# Patient Record
Sex: FEMALE | Race: WHITE
[De-identification: names, ages, dates, MRNs, and addresses within clinical notes are randomized per-mention and may not be internally consistent; named-entity substitution may affect disease eponyms.]

---

## 2019-07-30 ENCOUNTER — HOSPITAL ENCOUNTER (OUTPATIENT)
Dept: HOSPITAL 95 - ORSCMMR | Age: 69
Discharge: HOME | End: 2019-07-30
Attending: ORTHOPAEDIC SURGERY
Payer: COMMERCIAL

## 2019-07-30 VITALS — WEIGHT: 154.76 LBS | HEIGHT: 64.02 IN | BODY MASS INDEX: 26.42 KG/M2

## 2019-07-30 DIAGNOSIS — F17.210: ICD-10-CM

## 2019-07-30 DIAGNOSIS — Z79.899: ICD-10-CM

## 2019-07-30 DIAGNOSIS — K21.9: ICD-10-CM

## 2019-07-30 DIAGNOSIS — S52.552A: Primary | ICD-10-CM

## 2019-07-30 DIAGNOSIS — F43.10: ICD-10-CM

## 2019-07-30 PROCEDURE — 0PSJ34Z REPOSITION LEFT RADIUS WITH INTERNAL FIXATION DEVICE, PERCUTANEOUS APPROACH: ICD-10-PCS | Performed by: ORTHOPAEDIC SURGERY

## 2019-07-30 NOTE — NUR
INTO STEP VIA BRODY. PT A&OX3. REPORTS 3/10 LEFT ARM PAIN. CAST INTACT TO
LEFT ARM-NO NOTED BLEEDING OR SWELLING. CIRC CHECK GOOD. SATS 88% ON RA.
PLACED ON 2 LITERS NASAL CANULA TO KEEP SATS>90% PT DENIES NAUSEA. GIVEN APPLE
JUICE AND GRAM CRACKERS PER PT REQUEST.

## 2019-07-30 NOTE — NUR
Patient up to Ambulate independently. Gait steady.  Dressing to procedure site
clean, dry, intact with no visible drainage, swelling, erythema or bruising
noted.  Discharge instructions reviewed with patient. Patient verbalizes
understanding.  Copy given to patient to take home.  Discharged via wheelchair
to private car for ride home.

## 2019-07-30 NOTE — NUR
PATIENT REFUSES TO REMOVE DENTURES. PATIENT STATES THAT BOTTOME IS PARTIAL AND
UPPER IS A DENTURE BUT FITS VERY TIGHT. INFORMED PATIENT THAT THERE'S A RISK
OF INJURY TO UPPER DENTURE IF LEFT IN; PATIENT AGREES.

## 2019-11-08 ENCOUNTER — HOSPITAL ENCOUNTER (OUTPATIENT)
Dept: HOSPITAL 95 - ORSCSDS | Age: 69
Discharge: HOME | End: 2019-11-08
Attending: ORTHOPAEDIC SURGERY
Payer: COMMERCIAL

## 2019-11-08 VITALS — HEIGHT: 64.02 IN | BODY MASS INDEX: 9.86 KG/M2 | WEIGHT: 57.76 LBS

## 2019-11-08 DIAGNOSIS — G47.33: ICD-10-CM

## 2019-11-08 DIAGNOSIS — J44.9: ICD-10-CM

## 2019-11-08 DIAGNOSIS — Z79.899: ICD-10-CM

## 2019-11-08 DIAGNOSIS — F17.210: ICD-10-CM

## 2019-11-08 DIAGNOSIS — F43.10: ICD-10-CM

## 2019-11-08 DIAGNOSIS — S52.592D: Primary | ICD-10-CM

## 2019-11-08 DIAGNOSIS — K21.9: ICD-10-CM

## 2019-11-08 PROCEDURE — 0PPL04Z REMOVAL OF INTERNAL FIXATION DEVICE FROM LEFT ULNA, OPEN APPROACH: ICD-10-PCS | Performed by: ORTHOPAEDIC SURGERY

## 2019-11-08 PROCEDURE — 0PBL0ZZ EXCISION OF LEFT ULNA, OPEN APPROACH: ICD-10-PCS | Performed by: ORTHOPAEDIC SURGERY

## 2019-11-08 PROCEDURE — C1713 ANCHOR/SCREW BN/BN,TIS/BN: HCPCS

## 2019-11-08 NOTE — NUR
11/08/19 1038 Ashley Wray
FIRST IV ATTEMPT RIGHT HAND. IV INFILTRATED
2ND IV ATTEMPT RIGHT FOREARM. IV INFILTRATED

## 2020-05-14 ENCOUNTER — HOSPITAL ENCOUNTER (OUTPATIENT)
Dept: HOSPITAL 95 - LAB SHORT | Age: 70
Discharge: HOME | End: 2020-05-14
Attending: ORTHOPAEDIC SURGERY
Payer: COMMERCIAL

## 2020-05-14 DIAGNOSIS — M70.42: Primary | ICD-10-CM

## 2020-05-14 LAB
EOSINOPHIL NFR FLD MANUAL: 3 % (ref 0–2)
LYMPHOCYTES NFR FLD MANUAL: 40 % (ref 0–15)
MONONUC CELLS NFR FLD MANUAL: 9 % (ref 0–65)
NEUTS SEG NFR FLD MANUAL: 48 % (ref 0–24)
RBC # FLD MANUAL: (no result) /MM3 (ref 0–0)
RBC # FLD MANUAL: 0.02 M/MM3 (ref 0–0)
WBC # SNV AUTO: 0.09 K/MM3 (ref 0–180)
WBC # SNV MANUAL: 90 /MM3 (ref 0–180)

## 2020-06-17 ENCOUNTER — HOSPITAL ENCOUNTER (INPATIENT)
Dept: HOSPITAL 95 - ER | Age: 70
LOS: 4 days | Discharge: HOME | DRG: 286 | End: 2020-06-21
Attending: HOSPITALIST | Admitting: HOSPITALIST
Payer: COMMERCIAL

## 2020-06-17 VITALS — HEIGHT: 64.02 IN | BODY MASS INDEX: 50.02 KG/M2 | WEIGHT: 293 LBS

## 2020-06-17 DIAGNOSIS — F32.9: ICD-10-CM

## 2020-06-17 DIAGNOSIS — K21.9: ICD-10-CM

## 2020-06-17 DIAGNOSIS — J44.9: ICD-10-CM

## 2020-06-17 DIAGNOSIS — I48.0: Primary | ICD-10-CM

## 2020-06-17 DIAGNOSIS — Z87.891: ICD-10-CM

## 2020-06-17 DIAGNOSIS — I08.1: ICD-10-CM

## 2020-06-17 DIAGNOSIS — I27.20: ICD-10-CM

## 2020-06-17 DIAGNOSIS — I50.21: ICD-10-CM

## 2020-06-17 DIAGNOSIS — M81.0: ICD-10-CM

## 2020-06-17 DIAGNOSIS — I42.0: ICD-10-CM

## 2020-06-17 LAB
ALBUMIN SERPL BCP-MCNC: 3.4 G/DL (ref 3.4–5)
ALBUMIN/GLOB SERPL: 1 {RATIO} (ref 0.8–1.8)
ALT SERPL W P-5'-P-CCNC: 26 U/L (ref 12–78)
ANION GAP SERPL CALCULATED.4IONS-SCNC: 12 MMOL/L (ref 6–16)
AST SERPL W P-5'-P-CCNC: 25 U/L (ref 12–37)
BASOPHILS # BLD AUTO: 0.08 K/MM3 (ref 0–0.23)
BASOPHILS NFR BLD AUTO: 1 % (ref 0–2)
BILIRUB SERPL-MCNC: 1.3 MG/DL (ref 0.1–1)
BUN SERPL-MCNC: 22 MG/DL (ref 8–24)
CALCIUM SERPL-MCNC: 8.8 MG/DL (ref 8.5–10.1)
CHLORIDE SERPL-SCNC: 104 MMOL/L (ref 98–108)
CO2 SERPL-SCNC: 18 MMOL/L (ref 21–32)
CREAT SERPL-MCNC: 0.89 MG/DL (ref 0.4–1)
DEPRECATED RDW RBC AUTO: 51.8 FL (ref 35.1–46.3)
EOSINOPHIL # BLD AUTO: 0.12 K/MM3 (ref 0–0.68)
EOSINOPHIL NFR BLD AUTO: 2 % (ref 0–6)
ERYTHROCYTE [DISTWIDTH] IN BLOOD BY AUTOMATED COUNT: 13.1 % (ref 11.7–14.2)
GLOBULIN SER CALC-MCNC: 3.3 G/DL (ref 2.2–4)
GLUCOSE SERPL-MCNC: 112 MG/DL (ref 70–99)
HCT VFR BLD AUTO: 47.5 % (ref 33–51)
HGB BLD-MCNC: 15 G/DL (ref 11.5–16)
IMM GRANULOCYTES # BLD AUTO: 0.02 K/MM3 (ref 0–0.1)
IMM GRANULOCYTES NFR BLD AUTO: 0 % (ref 0–1)
LYMPHOCYTES # BLD AUTO: 1.48 K/MM3 (ref 0.84–5.2)
LYMPHOCYTES NFR BLD AUTO: 18 % (ref 21–46)
MCHC RBC AUTO-ENTMCNC: 31.6 G/DL (ref 31.5–36.5)
MCV RBC AUTO: 105 FL (ref 80–100)
MONOCYTES # BLD AUTO: 0.82 K/MM3 (ref 0.16–1.47)
MONOCYTES NFR BLD AUTO: 10 % (ref 4–13)
NEUTROPHILS # BLD AUTO: 5.74 K/MM3 (ref 1.96–9.15)
NEUTROPHILS NFR BLD AUTO: 70 % (ref 41–73)
NRBC # BLD AUTO: 0 K/MM3 (ref 0–0.02)
NRBC BLD AUTO-RTO: 0 /100 WBC (ref 0–0.2)
PLATELET # BLD AUTO: 292 K/MM3 (ref 150–400)
POTASSIUM SERPL-SCNC: 3.9 MMOL/L (ref 3.5–5.5)
PROT SERPL-MCNC: 6.7 G/DL (ref 6.4–8.2)
SODIUM SERPL-SCNC: 134 MMOL/L (ref 136–145)
TROPONIN I SERPL-MCNC: 0.02 NG/ML (ref 0–0.04)

## 2020-06-17 PROCEDURE — C1769 GUIDE WIRE: HCPCS

## 2020-06-17 PROCEDURE — C9113 INJ PANTOPRAZOLE SODIUM, VIA: HCPCS

## 2020-06-17 PROCEDURE — A9270 NON-COVERED ITEM OR SERVICE: HCPCS

## 2020-06-17 PROCEDURE — C1894 INTRO/SHEATH, NON-LASER: HCPCS

## 2020-06-17 PROCEDURE — G0378 HOSPITAL OBSERVATION PER HR: HCPCS

## 2020-06-17 NOTE — NUR
ADMIT NOTE
RECIEVED REPORT FROM ELEAZAR WANG IN ED. PT TO ROOM VIA BRODY; SBA TO BATHROOM
UPON ADMISSION TO ROOM. PT ORIENTED TO ROOM AND CALL LIGHT. PT EDUCATED ON
FALL RISK AND CALL LIGHT. PT REPORTS SOB AND SWELLING OVER THE LAST MONTH;
WENT TO DR MUSTAFA AND WAS SENT TO ED FOR FURTHER EVAL. PT A&Ox4 CALM AND
COOPERATIVE WITH CARE. PT SBA ASSIST IN ROOM DUE TO IV POLE. PT REPORTS SOB,
SPO2 >90% ON RA; PT INSISTS ON WEARING 2L O2 VIA NC FOR COMFORT; LS CLEAR.
SWELLING NOTED TO BLE. PER TELE HR 'S PT ON CARDIZEM GTT AT 10MH/HR.
OTHER VSS. NO OTHER ACUTE CHANGES NOTED DURING SHIFT. WILL CONTINUE TO MONITOR
UNTIL REPORT GIVEN TO ONCOMING RN.

## 2020-06-18 LAB
ANION GAP SERPL CALCULATED.4IONS-SCNC: 9 MMOL/L (ref 6–16)
BUN SERPL-MCNC: 19 MG/DL (ref 8–24)
CALCIUM SERPL-MCNC: 8.7 MG/DL (ref 8.5–10.1)
CHLORIDE SERPL-SCNC: 105 MMOL/L (ref 98–108)
CO2 SERPL-SCNC: 23 MMOL/L (ref 21–32)
CREAT SERPL-MCNC: 0.79 MG/DL (ref 0.4–1)
GLUCOSE SERPL-MCNC: 95 MG/DL (ref 70–99)
PLATELET # BLD AUTO: 268 K/MM3 (ref 150–400)
POTASSIUM SERPL-SCNC: 3.6 MMOL/L (ref 3.5–5.5)
PROTHROMBIN TIME: 11.9 SEC (ref 9.7–11.5)
SODIUM SERPL-SCNC: 137 MMOL/L (ref 136–145)

## 2020-06-18 NOTE — NUR
SHIFT SUMMARY
Pt remains on cardizem gtt infusing at 10mg/hr; HR sustaining ,
last /82. Pt is fully alert and oriented, expresses needs with call
light, VSS. No acute events on tele, no concerns overnight. Pt endorses
mild SOB with movement - 2L NC for comfort per pt. Oxygen saturations
>94%. No apparent signs of distress. No acute changes from shift
assessment. Will continue to monitor until day RN assumes care.

## 2020-06-18 NOTE — NUR
PT UPDATE...
 
AT APROX 1120PT WAS UP TO THE BATHROOM TO HAVE A BM, PT CAME OUT OF THE
BATHROOM, PALE DIAPHORETIC AND OFF BALANCE. THIS RN GOT THE PT TO THE BED AND
CHECKED HER VITALS, PT'S BP AT THE TIME WAS 77/56, HR 85, RR 28 TEMP 97.4. PT
STATED "I FEEL LIKE I AM BURNING UP." PT WAS LAID BACK ON THE BED AND BP WAS
RECHECKED AND IT /83, PT STATED SHE FELT "A LITTLE BETTER" BUT "STILL
FEELS VERY TIRED AND SICK."
 
HEPARIN GTT STARTED PER ORDERS. PT STATED THAT SINCE STARTING THE BOWEL CARE
THIS AM SHE HAS HAD AT LEAST 5 EPISODES OF LOOSE STOOLS.
 
CARDIOLOGY PROVIDER IN THE ROOM AND SPOKE TO THE PT ABOUT HAVING AN AGNIO, ANDREW
W/POSSIBLE CARDIOVERSION TOMORROW. THIS RN ATTEMPTED TO EDUCATE THE PT ON
THESE PROCEDURES BUT SHE SAID "I JUST FEEL TOO SICK RIGHT NOW TO TALK ABOUT
IT."
 
WILL CONTINUE TO MONITOR.

## 2020-06-18 NOTE — NUR
AM NOTE...
 
ASSUMED CARE OF PT APROX 0700, PT IS A&Ox4 AND SBA/IND IN THE ROOM. PT WAS
ADMITTED FOR AFIB RVR AND IS CURRENTLY IN AFIB IN THE 90'S-100'S. PT STATES
HER BREATHING HAS IMPROVED FROM YESTERDAY AS WELL AS THE EDEMA TO HER BLE. PT
STILL BECOMES SOB WITH ACTIVITY/WALKING TO THE BATHROOM BUT RECOVERS QUICKLY.
PT'S VS STABLE AT THIS TIME, PT IS USING 2L NC PRN FOR COMFORT AT THIS TIME.
PROVIDER AT THE BEDSIDE THIS AM, PT TOLD THE PROVIDER THAT SHE HAS NOT BEEN
HAVING "NORMAL BMS FOR A LONG TIME." AND THAT SHE HAS A COLONOSCOPY SCHEDULED
NEXT THURSDAY 6/25. BOWEL CARE STARTED PER VERBAL ORDERS. PT CURRENTLY DENIES
ANY CHEST PAIN/PRESSURE OR INCREASED SOB. PO LOPRESSOR STARTED PER ORDERS.
 
CALL LIGHT IN REACH WILL CONTINUE TO MONITOR.

## 2020-06-18 NOTE — NUR
SHIFT SUMMARY...
 
NO ACUTE NEGATIVE CHANGES SINCE PRVIOUS NOTE. PT'S BP HAS IMPROVED AND IS
107/70. PT C/O OF LIGHT HEADED/DIZZINESS WITH STANDING/MOVING TO QUICKLY.
ORTHOSTATIC BPS WERE DONE (LAYIN/64 HR 80, SITTIN/61, HR 92,
STANDIN/65, HR 84) .  PT EDUCATED ON USING CALL LIGHT FOR HELP BEFORE
GETTING UP AND TO MOVE SLOWLY TO AVOID BECOMING DIZZY. PT VERBALIZED HER
UNDERSTANDING. PT'S OTHER VS STABLE AT THIS TIME. PT IS STILL IN AFIB BUT
CONTROLLED IN THE 80'S-'S. PT IS TO BE NPO AFTER MIDNIGHT FOR POSSIBLE
ANGIO/ANDREW/CARDIOVERSION WITH DR. LOMAX.  CONSENT WAS SIGNED AND PLACED IN THE
CHART.
 
CALL LIGHT IN REACH WILL CONTINUE TO MONITOR UNTIL REPORT IS GIVEN TO ONCOMING
RN.

## 2020-06-19 LAB
ANION GAP SERPL CALCULATED.4IONS-SCNC: 7 MMOL/L (ref 6–16)
BUN SERPL-MCNC: 26 MG/DL (ref 8–24)
CALCIUM SERPL-MCNC: 8.9 MG/DL (ref 8.5–10.1)
CHLORIDE SERPL-SCNC: 105 MMOL/L (ref 98–108)
CO2 SERPL-SCNC: 24 MMOL/L (ref 21–32)
CREAT SERPL-MCNC: 1.21 MG/DL (ref 0.4–1)
GLUCOSE SERPL-MCNC: 108 MG/DL (ref 70–99)
MAGNESIUM SERPL-MCNC: 1.9 MG/DL (ref 1.6–2.4)
POTASSIUM SERPL-SCNC: 4.6 MMOL/L (ref 3.5–5.5)
SODIUM SERPL-SCNC: 136 MMOL/L (ref 136–145)

## 2020-06-19 PROCEDURE — 4A023N7 MEASUREMENT OF CARDIAC SAMPLING AND PRESSURE, LEFT HEART, PERCUTANEOUS APPROACH: ICD-10-PCS | Performed by: INTERNAL MEDICINE

## 2020-06-19 PROCEDURE — B246ZZ4 ULTRASONOGRAPHY OF RIGHT AND LEFT HEART, TRANSESOPHAGEAL: ICD-10-PCS | Performed by: INTERNAL MEDICINE

## 2020-06-19 PROCEDURE — 5A2204Z RESTORATION OF CARDIAC RHYTHM, SINGLE: ICD-10-PCS | Performed by: INTERNAL MEDICINE

## 2020-06-19 PROCEDURE — B2111ZZ FLUOROSCOPY OF MULTIPLE CORONARY ARTERIES USING LOW OSMOLAR CONTRAST: ICD-10-PCS | Performed by: INTERNAL MEDICINE

## 2020-06-19 NOTE — NUR
SHIFT SUMMARY...
 
PT HAD ANGIO TODAY AT APROX 1130, NO INTERVENTIONS WERE DONE. PT HAS RIGHT
RADIAL SITE, TR BAND WAS RECOVERED AND HEPARIN GTT RESTARTED PER ORDERS OF DR. LOMAX. PT THEN HAD A ANDREW AND CARDIOVERSION AND PT IS CURRENTLY IN SR W/PACS
AND PVCS IN THE 80'S. PT'S VS STABLE T/O THE PROCEDURE AND THIS SHIFT. PT HAS
BEEN PLEASENTLY CONFUSED WITH THE SEDATION MEDICATION THIS AFTERNOON, BED
ALARM TURNED ON.
 
CALL LIGHT IN REACH WILL CONTINUE TO MONITOR UNTIL REPORT IS GIVEN TO ON
COMING RN.

## 2020-06-19 NOTE — NUR
The pt returned to PCU 1 accompaned by Luca BUSH from [facility].  Pt is awake,
alert, and cooperative, no complaints of pain or discomfort.  TR band on the
right wrist is in place, and the site is without bleeding, bruising or
swelling.  ARea around the site is unremarkable.  Distal pulses and capillary
refill are within normal limits.  SPo2 measured on the 2nd digit of the right
hand is 92% and the pt is not wearing any supplemental oxygen. Vital signs
taken and noted in electronic record.  The pt will continue to be NPO in
anticipation of the ANDREW after the recovery of the TR band.
Phone call from Dr. Bonner at this time, and the plan explained again: Keep the
pt NPO, recover the TR band, and restart the heparin, then ANDREW with
cardioversion to be done by Dr. Bonner in the room.
Order was placed for ANDREW in [facility].  Called Dede in the pharmacy to update
her on the heparin gtt and plan to restart once the TR band is recovered.

## 2020-06-19 NOTE — NUR
AM NOTE...
 
ASSUMED CARE OF PT APROX 0700. PT IS A&Ox4 AND IND IN THE ROOM. PT IS NPO FOR
ANGIO THIS AM AND ANDREW/CARDIOVERSION LATER TODAY. PT'S HEPARIN GTT STOPED PER
VERBAL ORDER FROM DR. LOMAX AT 0730. IV LASIX HELD PER DR. LOMAX'S REQUEST AS
WELL. PT'S VS STABLE AT THIS TIME. L/S CLEAR IN THE UPPER/MID LOBES FAINT
FINE CRACKLES HEARD IN THE LOWER LOBES. PT IS ON RA WITH 2L NC PRN COMFORT. BT
PRESENT AND HYPERACTIVE PT STATES SHE IS STILL HAVING LOOSE STOOLS FROM BOWEL
CARE STARTED YESTERDAY. PT HAS 3+ PITTING EDEMA TO HER BLE, TRACE EDEMA NOTED
TO HER UPPER THIGHS.
 
CALL LIGHT IN REACH WILL CONTINUE TO MONITOR.

## 2020-06-19 NOTE — NUR
SHIFT SUMMARY
 Pt with no acute events overnight, VSS, alert and oriented, ambulating to
 bathroom with minimal assistance. Renal function lab results noted to
 have decreased. Pt is on lasix 40 mg BID. Pt tearful at start of shift
 regarding prognosis. She spoke with this RN about friends who are a
 support. Pt has been NPO since midnight for possible ANDREW, cardioversion,
 and angio today per Dr. Bonner.
 
 Heprin gtt infusing 17 u/kg/hr per orders.
 
 No events on tele, no acute concerns to note.

## 2020-06-20 LAB
ANION GAP SERPL CALCULATED.4IONS-SCNC: 7 MMOL/L (ref 6–16)
BUN SERPL-MCNC: 26 MG/DL (ref 8–24)
CALCIUM SERPL-MCNC: 9.1 MG/DL (ref 8.5–10.1)
CHLORIDE SERPL-SCNC: 102 MMOL/L (ref 98–108)
CO2 SERPL-SCNC: 27 MMOL/L (ref 21–32)
CREAT SERPL-MCNC: 1.01 MG/DL (ref 0.4–1)
GLUCOSE SERPL-MCNC: 110 MG/DL (ref 70–99)
MAGNESIUM SERPL-MCNC: 1.9 MG/DL (ref 1.6–2.4)
POTASSIUM SERPL-SCNC: 3.9 MMOL/L (ref 3.5–5.5)
SODIUM SERPL-SCNC: 136 MMOL/L (ref 136–145)

## 2020-06-20 NOTE — NUR
patient did well overnight, vital signs were stable. Patient was found to
still be in afib despite of the cardioversion treatment performed earlier in
the day. Patient continues to make adequate urine and reports feeling much
better than the day before with less shortness of breath. On assessment of the
patient s heart rate was irregular, lungs were clear to auscultation, and
other systems were within normal limits (please refer to charting)

## 2020-06-20 NOTE — NUR
PT HRR REMAINS ON THE 90'S-110'S MEDICALLY CONTROLLED, THE REST OF THE VITALS
STABLE. SATS ABOVE 94% ON ROOMAIR, PT AMBULATED WITH RT AND DOES NOT RECOMMEND
ANY OXYGEN SUPPLEMENTATION. PT APPEARS ANXIOUS, PT WAS TACHYCARDIC AT ONE
POINT 'S PT WAS EMOTIONAL IN THE ROOM STATING SHE FELT DEPPRESSED AS SHE
WAS HAVING A HARD TIME GOING THROUGH THIS ILLNESS PT ALSO STATED SHE MISSED
TAKING HER ANTIDEPRESSANTS SINCE SHE GOT HOSPITALIZED, PROVIDER WAS MADE AWARE
PT WAS STARTED TODAY ON WELLBUTRIN 150MG. PT ALSO DISCUSSED WITH THE PROVIDER
HOW SHE FEELS NOT SAFE TO GO HOME TODAY SINCE SHE LIVES ALONE AND NOT FEELING
TO BE ABLE TO TAKE CARE OF HERSELF/NEEDS. PT TO DISCHARGE TOMORROW INSTEAD. PT
HAS BEEN AMBULATING INDEPENDENTLY IN THE ROOM WITHOUT ISSUES. STILL HAS 2+
PITTING EDEMA ON BOTH LEGS, ELASTIC STOCKINGS APPLIED. WILL MONITOR PT

## 2020-06-21 LAB
ALBUMIN SERPL BCP-MCNC: 3.3 G/DL (ref 3.4–5)
ANION GAP SERPL CALCULATED.4IONS-SCNC: 8 MMOL/L (ref 6–16)
BUN SERPL-MCNC: 28 MG/DL (ref 8–24)
CALCIUM SERPL-MCNC: 9 MG/DL (ref 8.5–10.1)
CHLORIDE SERPL-SCNC: 101 MMOL/L (ref 98–108)
CO2 SERPL-SCNC: 26 MMOL/L (ref 21–32)
CREAT SERPL-MCNC: 1.09 MG/DL (ref 0.4–1)
GLUCOSE SERPL-MCNC: 139 MG/DL (ref 70–99)
MAGNESIUM SERPL-MCNC: 1.6 MG/DL (ref 1.6–2.4)
PHOSPHATE SERPL-MCNC: 4.2 MG/DL (ref 2.5–4.9)
POTASSIUM SERPL-SCNC: 4.4 MMOL/L (ref 3.5–5.5)
SODIUM SERPL-SCNC: 135 MMOL/L (ref 136–145)

## 2020-06-21 NOTE — NUR
PT DISCHARGE TO HOME TODAY WITH DISCHARGE ORDERS PT TO SEE HOME HEALTH. TO
CONTINUE TAKING NEW MEDICATION AND RESUME HOME MEDS. PT TO FOLLOW UP [facility] PCP
WITHIN A WEEK AND DR LOMAX WITHIN 2 WEEKS. PT SIGNED THE CONSENT FORM. VITALS
HAS BEEN STABLE. ALL BELONGINGS SENT WITH PATIENT. PT ACCOMPANIED BY CNA VIA
WHEELCHAIR FOR A FRIEND TO  FOR TRANSPORT.

## 2020-06-21 NOTE — NUR
on a clinical standpoint patient did well overnight, vital signs remain stable
with a heart rate in afib between 90s and 110s. Patient complains that she
still does not feel well and would like to talk to the cardiologist regarding
her discharge plan and ongoing treatment for her afib. Patient denies chest
pain, pressure, shortness of breath, palpitations and/or any other cardio
pulmonary symptoms. The plan for the patient is to go home today, June 21.

## 2020-07-30 ENCOUNTER — HOSPITAL ENCOUNTER (INPATIENT)
Dept: HOSPITAL 95 - ER | Age: 70
LOS: 12 days | Discharge: HOME | DRG: 291 | End: 2020-08-11
Attending: INTERNAL MEDICINE | Admitting: HOSPITALIST
Payer: COMMERCIAL

## 2020-07-30 VITALS — HEIGHT: 64.02 IN | BODY MASS INDEX: 27.96 KG/M2 | WEIGHT: 163.8 LBS

## 2020-07-30 DIAGNOSIS — D69.6: ICD-10-CM

## 2020-07-30 DIAGNOSIS — I48.0: ICD-10-CM

## 2020-07-30 DIAGNOSIS — I37.1: ICD-10-CM

## 2020-07-30 DIAGNOSIS — B96.5: ICD-10-CM

## 2020-07-30 DIAGNOSIS — L03.116: ICD-10-CM

## 2020-07-30 DIAGNOSIS — Z20.828: ICD-10-CM

## 2020-07-30 DIAGNOSIS — E87.5: ICD-10-CM

## 2020-07-30 DIAGNOSIS — I34.0: ICD-10-CM

## 2020-07-30 DIAGNOSIS — L03.115: ICD-10-CM

## 2020-07-30 DIAGNOSIS — E88.09: ICD-10-CM

## 2020-07-30 DIAGNOSIS — Z51.5: ICD-10-CM

## 2020-07-30 DIAGNOSIS — J44.9: ICD-10-CM

## 2020-07-30 DIAGNOSIS — Z66: ICD-10-CM

## 2020-07-30 DIAGNOSIS — N18.9: ICD-10-CM

## 2020-07-30 DIAGNOSIS — K21.9: ICD-10-CM

## 2020-07-30 DIAGNOSIS — I95.9: ICD-10-CM

## 2020-07-30 DIAGNOSIS — K59.00: ICD-10-CM

## 2020-07-30 DIAGNOSIS — E87.2: ICD-10-CM

## 2020-07-30 DIAGNOSIS — N17.9: ICD-10-CM

## 2020-07-30 DIAGNOSIS — I27.20: ICD-10-CM

## 2020-07-30 DIAGNOSIS — I13.0: Primary | ICD-10-CM

## 2020-07-30 DIAGNOSIS — E16.2: ICD-10-CM

## 2020-07-30 DIAGNOSIS — F32.9: ICD-10-CM

## 2020-07-30 DIAGNOSIS — I42.9: ICD-10-CM

## 2020-07-30 DIAGNOSIS — E83.42: ICD-10-CM

## 2020-07-30 DIAGNOSIS — I50.23: ICD-10-CM

## 2020-07-30 DIAGNOSIS — J98.11: ICD-10-CM

## 2020-07-30 DIAGNOSIS — E83.51: ICD-10-CM

## 2020-07-30 DIAGNOSIS — J96.01: ICD-10-CM

## 2020-07-30 DIAGNOSIS — N39.0: ICD-10-CM

## 2020-07-30 DIAGNOSIS — E87.1: ICD-10-CM

## 2020-07-30 DIAGNOSIS — R57.0: ICD-10-CM

## 2020-07-30 PROCEDURE — C1751 CATH, INF, PER/CENT/MIDLINE: HCPCS

## 2020-07-30 PROCEDURE — P9041 ALBUMIN (HUMAN),5%, 50ML: HCPCS

## 2020-07-30 PROCEDURE — U0002 COVID-19 LAB TEST NON-CDC: HCPCS

## 2020-07-30 PROCEDURE — C9113 INJ PANTOPRAZOLE SODIUM, VIA: HCPCS

## 2020-07-30 PROCEDURE — A9270 NON-COVERED ITEM OR SERVICE: HCPCS

## 2020-07-31 LAB
ALBUMIN SERPL BCP-MCNC: 2.3 G/DL (ref 3.4–5)
ALBUMIN SERPL BCP-MCNC: 2.3 G/DL (ref 3.4–5)
ALBUMIN SERPL BCP-MCNC: 2.5 G/DL (ref 3.4–5)
ALBUMIN/GLOB SERPL: 0.8 {RATIO} (ref 0.8–1.8)
ALT SERPL W P-5'-P-CCNC: 19 U/L (ref 12–78)
ANION GAP SERPL CALCULATED.4IONS-SCNC: 10 MMOL/L (ref 6–16)
ANION GAP SERPL CALCULATED.4IONS-SCNC: 13 MMOL/L (ref 6–16)
ANION GAP SERPL CALCULATED.4IONS-SCNC: 19 MMOL/L (ref 6–16)
AST SERPL W P-5'-P-CCNC: 45 U/L (ref 12–37)
BASOPHILS # BLD AUTO: 0.05 K/MM3 (ref 0–0.23)
BASOPHILS # BLD AUTO: 0.08 K/MM3 (ref 0–0.23)
BASOPHILS NFR BLD AUTO: 0 % (ref 0–2)
BASOPHILS NFR BLD AUTO: 1 % (ref 0–2)
BILIRUB SERPL-MCNC: 1.5 MG/DL (ref 0.1–1)
BUN SERPL-MCNC: 35 MG/DL (ref 8–24)
BUN SERPL-MCNC: 38 MG/DL (ref 8–24)
BUN SERPL-MCNC: 39 MG/DL (ref 8–24)
CALCIUM SERPL-MCNC: 7 MG/DL (ref 8.5–10.1)
CALCIUM SERPL-MCNC: 7.6 MG/DL (ref 8.5–10.1)
CALCIUM SERPL-MCNC: 7.9 MG/DL (ref 8.5–10.1)
CHLORIDE SERPL-SCNC: 88 MMOL/L (ref 98–108)
CHLORIDE SERPL-SCNC: 89 MMOL/L (ref 98–108)
CHLORIDE SERPL-SCNC: 95 MMOL/L (ref 98–108)
CK SERPL-CCNC: 144 U/L (ref 26–193)
CO2 SERPL-SCNC: 18 MMOL/L (ref 21–32)
CO2 SERPL-SCNC: 24 MMOL/L (ref 21–32)
CO2 SERPL-SCNC: 9 MMOL/L (ref 21–32)
CREAT SERPL-MCNC: 1.73 MG/DL (ref 0.4–1)
CREAT SERPL-MCNC: 1.92 MG/DL (ref 0.4–1)
CREAT SERPL-MCNC: 2.02 MG/DL (ref 0.4–1)
DEPRECATED RDW RBC AUTO: 55.5 FL (ref 35.1–46.3)
DEPRECATED RDW RBC AUTO: 59.7 FL (ref 35.1–46.3)
EOSINOPHIL # BLD AUTO: 0 K/MM3 (ref 0–0.68)
EOSINOPHIL # BLD AUTO: 0 K/MM3 (ref 0–0.68)
EOSINOPHIL NFR BLD AUTO: 0 % (ref 0–6)
EOSINOPHIL NFR BLD AUTO: 0 % (ref 0–6)
ERYTHROCYTE [DISTWIDTH] IN BLOOD BY AUTOMATED COUNT: 15.9 % (ref 11.7–14.2)
ERYTHROCYTE [DISTWIDTH] IN BLOOD BY AUTOMATED COUNT: 16.3 % (ref 11.7–14.2)
GLOBULIN SER CALC-MCNC: 3 G/DL (ref 2.2–4)
GLUCOSE SERPL-MCNC: 129 MG/DL (ref 70–99)
GLUCOSE SERPL-MCNC: 58 MG/DL (ref 70–99)
GLUCOSE SERPL-MCNC: 85 MG/DL (ref 70–99)
HCT VFR BLD AUTO: 40.1 % (ref 33–51)
HCT VFR BLD AUTO: 43.8 % (ref 33–51)
HCT VFR BLD AUTO: 47.5 % (ref 33–51)
HGB BLD-MCNC: 13.5 G/DL (ref 11.5–16)
HGB BLD-MCNC: 14.4 G/DL (ref 11.5–16)
HGB BLD-MCNC: 14.8 G/DL (ref 11.5–16)
IMM GRANULOCYTES # BLD AUTO: 0.11 K/MM3 (ref 0–0.1)
IMM GRANULOCYTES # BLD AUTO: 0.17 K/MM3 (ref 0–0.1)
IMM GRANULOCYTES NFR BLD AUTO: 1 % (ref 0–1)
IMM GRANULOCYTES NFR BLD AUTO: 1 % (ref 0–1)
LEUKOCYTE ESTERASE UR QL STRIP: (no result)
LYMPHOCYTES # BLD AUTO: 1.24 K/MM3 (ref 0.84–5.2)
LYMPHOCYTES # BLD AUTO: 1.98 K/MM3 (ref 0.84–5.2)
LYMPHOCYTES NFR BLD AUTO: 12 % (ref 21–46)
LYMPHOCYTES NFR BLD AUTO: 8 % (ref 21–46)
MAGNESIUM SERPL-MCNC: 0.7 MG/DL (ref 1.6–2.4)
MCHC RBC AUTO-ENTMCNC: 31.2 G/DL (ref 31.5–36.5)
MCHC RBC AUTO-ENTMCNC: 32.9 G/DL (ref 31.5–36.5)
MCV RBC AUTO: 101 FL (ref 80–100)
MCV RBC AUTO: 95 FL (ref 80–100)
MONOCYTES # BLD AUTO: 0.56 K/MM3 (ref 0.16–1.47)
MONOCYTES # BLD AUTO: 0.65 K/MM3 (ref 0.16–1.47)
MONOCYTES NFR BLD AUTO: 4 % (ref 4–13)
MONOCYTES NFR BLD AUTO: 4 % (ref 4–13)
NEUTROPHILS # BLD AUTO: 13.44 K/MM3 (ref 1.96–9.15)
NEUTROPHILS # BLD AUTO: 13.54 K/MM3 (ref 1.96–9.15)
NEUTROPHILS NFR BLD AUTO: 83 % (ref 41–73)
NEUTROPHILS NFR BLD AUTO: 87 % (ref 41–73)
NRBC # BLD AUTO: 0.04 K/MM3 (ref 0–0.02)
NRBC # BLD AUTO: 0.06 K/MM3 (ref 0–0.02)
NRBC BLD AUTO-RTO: 0.3 /100 WBC (ref 0–0.2)
NRBC BLD AUTO-RTO: 0.4 /100 WBC (ref 0–0.2)
OSMOLALITY SERPL: 263 MOS/KG (ref 275–300)
PH BLDA: 7.29 [PH] (ref 7.35–7.45)
PH BLDV: 7.31 [PH] (ref 7.34–7.37)
PH BLDV: 7.38 [PH] (ref 7.34–7.37)
PHOSPHATE SERPL-MCNC: 3.3 MG/DL (ref 2.5–4.9)
PHOSPHATE SERPL-MCNC: 4.4 MG/DL (ref 2.5–4.9)
PHOSPHATE SERPL-MCNC: 5.3 MG/DL (ref 2.5–4.9)
PLATELET # BLD AUTO: 271 K/MM3 (ref 150–400)
PLATELET # BLD AUTO: 296 K/MM3 (ref 150–400)
POTASSIUM SERPL-SCNC: 5.2 MMOL/L (ref 3.5–5.5)
POTASSIUM SERPL-SCNC: 6.3 MMOL/L (ref 3.5–5.5)
POTASSIUM SERPL-SCNC: 6.4 MMOL/L (ref 3.5–5.5)
PROT SERPL-MCNC: 5.3 G/DL (ref 6.4–8.2)
PROT UR STRIP-MCNC: (no result) MG/DL
RBC #/AREA URNS HPF: (no result) /HPF (ref 0–2)
SODIUM SERPL-SCNC: 120 MMOL/L (ref 136–145)
SODIUM SERPL-SCNC: 122 MMOL/L (ref 136–145)
SODIUM SERPL-SCNC: 123 MMOL/L (ref 136–145)
SP GR SPEC: 1.02 (ref 1–1.02)
TROPONIN I SERPL-MCNC: 0.04 NG/ML (ref 0–0.04)
TROPONIN I SERPL-MCNC: <0.015 NG/ML (ref 0–0.04)
TSH SERPL DL<=0.005 MIU/L-ACNC: 14.4 UIU/ML (ref 0.36–4.8)
URATE SERPL-MCNC: 11.8 MG/DL (ref 2.6–6)
UROBILINOGEN UR STRIP-MCNC: (no result) MG/DL
WBC #/AREA URNS HPF: (no result) /HPF (ref 0–5)

## 2020-07-31 PROCEDURE — 3E033XZ INTRODUCTION OF VASOPRESSOR INTO PERIPHERAL VEIN, PERCUTANEOUS APPROACH: ICD-10-PCS | Performed by: INTERNAL MEDICINE

## 2020-07-31 PROCEDURE — 05HM33Z INSERTION OF INFUSION DEVICE INTO RIGHT INTERNAL JUGULAR VEIN, PERCUTANEOUS APPROACH: ICD-10-PCS | Performed by: INTERNAL MEDICINE

## 2020-07-31 NOTE — NUR
Called to meet with pt to discuss her further decline and suffering.
Therputic time with pt. Assited with would care. She is making statements
about this is the end and suggested she call her brother. They had a brief
discussion but she was to fatigued to continue.
pt very painfull with any movment and
increasing fatigue. discussion of suffering and  need to revisit our
conversation on acceptance. Called her best friend to come in. Advise pt to
discuss with her fiend end of life care for help with decision. pt to frail
and sick to make decision on her own.
  Pt agreed to DNR discussed if she cant make her own decison with her friend
she will call brother and they will help with acceptance and plan.

## 2020-07-31 NOTE — NUR
PT TO ICU/SHIFT SUMMARY
PT TO ICU VIA BRODY WITH ED RN, PT ALERT AND ORIENTED, SPEECH SLURRED AND PT
SLOW TO RESPOND, PT ON 5L O2 PER NC, VERY DIFFICULT GETTING ACCURATE READING
FROM FINGER OR EAR, O2 SATURATIONS SHOWING 85-91%, CALL TO RT FOR FOREHEAD
PULSE OXIMETER AND O2 READINGS INCREASED %, O2 TITRATED TO 2L PER NC.
UPON ARRIVAL TO UNIT, MONITOR SHOWED SINUS TACH WITH -110, DIFFICULTY
OBTAINING BP READING, GETTING "MEAN ONLY" READINGS OF 80-90'S. PT WITH
MULTIPLE WOUNDS TO SACRUM AND BLE, PHOTOS TAKEN AND ON FILE. UNABLE TO OBTAIN
CBG UPON ARRIVAL AS EXTREMETIES VERY COLD AND CYANOTIC.
2G MAGNESIUM IV STARTED UPON ARRIVAL TO UNIT. CRITICAL LACTIC OF 8.9 CALLED TO
DR LUNA, UPDATED ON PTS STATUS, UNABLE TO OBTAIN ADDITIONAL IV ACCESS, ORDER
TO CONSULT INTENSIVIST. DR JURADO CALLED, IN TO SEE PT THIS AM, CENTRAL LINE
PLACED @ APPROX 0415 AND LEVO GTT INITIATED.
DR CHU IN TO SEE PT THIS AM, NOTIFIED OF CRITICAL POTASSIUM, ORDERS TO
INCREASE BICARB GTT, 1 AMP BICARB IV, 1 AMP D50 IV, 10 U INSULIN IV, AND 10G
LOKELMA PO. RENAL US COMPLETED THIS AM. REPORT GIVEN TO ANA CRISTINA BUSH.

## 2020-07-31 NOTE — NUR
Shift Summary:
 
Patient slept on/off throughout shift, but continues to rouse easily to verbal
stimuli, remains oriented x3. VS remain stable, levophed gtt turned off at
approx 1030hr, systolic BP 90's-low 100's. Central line to rt IJ remains
patent and intact, infusing without difficulty. Quiros cath remains patent and
intact, draining clear yellow urine. Poor apatite this shift, but tolerated
small amount of breakfast and lunch without difficulty.
 
 
At approx 1400hr, patient had large, loose maroon/red BM. Dr. Hernandez
notified, received order for STAT H+H, which resulted 13.5/40.1, only slightly
decreased from this morning. Dr. Hernandez also discontinued PO Xarelto. Patient
had x1 additional bloody BM this evening, small amount. Dr. Hernandez also aware
of additional BM, no new orders received at that time.
 
Throughout shift, appearance of patient's lt leg wound worsened. Dark/purple
discoloration surrounding wound increased in size. Swelling to BLE's also
increased throughout shift, from 2-3+ to 3-4+. Patient also became very
painful to touch of lower extremities, and modeling noted to rt knee. Call
placed to Dr. Hernandez r/t BLE's, all above information conveyed. Received order
for bilateral lower venous + arterial duplex study. Guided Interventions performing exam at
this time. Will continue to monitor until report to NOC shift RN.

## 2020-07-31 NOTE — NUR
ASSUMPTION OF CARE
 
PT ALERT AND ORIENTED, O2 SATURATIONS>90% ON RA, MONITOR SHOWS SINUS RHYTHM,
IRREGULAR AT TIMES, POSSIBLE PAC'S, HR 55-60'S, BP STABLE, LEVOPHED ON SB. PT
WITH SIGNIFICANT EDEMA TO BLE AND ABDOMEN, BUT IMPROVED FROM PREVIOUS SHIFT,
EXTREMETIES VERY COOL, PEDAL PULSES WEAK WITH DOPPLER. WOUND TO LOWER LEFT LEG
WITH LARGE AMOUNTS OF SEROSANGUINOUS DRAINAGE, PT REQUESTS WOUNDS REMAIN OPEN
TO AIR. REDDENED AREA AROUND L LOWER LEG WOUND, BLANCHABLE, VERY PAINFUL AND
HOT TO THE TOUCH. PT WITH POOR PO INTAKE, SWALLOWS PILLS WHOLE WITH WATER.
WITT REMAINS IN PLACE AND DRAINING YELLOW URINE. PT VERY WEAK BUT EAGER TO BE
AS INDEPENDENT AS POSSIBLE. PT REPORTS 9/10 PAIN TO BLE, TRAMADOL
ADMINISTERED, PT DECLINED TYLENOL.
 
NO MECHANICAL VTE PROPHYLAXIS D/T BLE WOUNDS, CHEMICAL VTE DC'D D/T BLOODY
STOOL DURING DAYSHIFT.
 
VBG RESULTS CALLED TO DR VALE AND DR CHU, ORDER TO DC BICARB GTT.

## 2020-07-31 NOTE — NUR
[place] of Care:
 
Care assumed at 0700hr. Patient sleeping, drowsy when awake, but responds
easily to verbal stimuli, oriented x3. Denies pain, discomfort, SOB, or
dyspnea. SpO2-94-98% on 2L/NC. Central line to rt IJ patent and intact
infusing without difficulty. Levophed gtt at 5mcg/min at shift change, the
decreased to 3mcg/min at approx 0730hr. BP remains stable. Quiros cath patent
and intact, draining clear yellow urine. Multiple wounds to legs, all wounds
cleansed and foam dressings applied. Ate approx 75% of breakfast without
difficulty. Tolerating PO fluids, food, and medications without difficulty.
Call light in reach, makes needs known. Will continue to monitor.

## 2020-08-01 LAB
ALBUMIN SERPL BCP-MCNC: 2.4 G/DL (ref 3.4–5)
ANION GAP SERPL CALCULATED.4IONS-SCNC: 12 MMOL/L (ref 6–16)
BASOPHILS # BLD AUTO: 0.05 K/MM3 (ref 0–0.23)
BASOPHILS NFR BLD AUTO: 0 % (ref 0–2)
BUN SERPL-MCNC: 38 MG/DL (ref 8–24)
CALCIUM SERPL-MCNC: 7.5 MG/DL (ref 8.5–10.1)
CHLORIDE SERPL-SCNC: 87 MMOL/L (ref 98–108)
CO2 SERPL-SCNC: 25 MMOL/L (ref 21–32)
CREAT SERPL-MCNC: 1.76 MG/DL (ref 0.4–1)
DEPRECATED RDW RBC AUTO: 53.7 FL (ref 35.1–46.3)
EOSINOPHIL # BLD AUTO: 0.04 K/MM3 (ref 0–0.68)
EOSINOPHIL NFR BLD AUTO: 0 % (ref 0–6)
ERYTHROCYTE [DISTWIDTH] IN BLOOD BY AUTOMATED COUNT: 16.2 % (ref 11.7–14.2)
GLUCOSE SERPL-MCNC: 79 MG/DL (ref 70–99)
HCT VFR BLD AUTO: 38.8 % (ref 33–51)
HCT VFR BLD AUTO: 39.1 % (ref 33–51)
HGB BLD-MCNC: 13.1 G/DL (ref 11.5–16)
HGB BLD-MCNC: 13.2 G/DL (ref 11.5–16)
IMM GRANULOCYTES # BLD AUTO: 0.09 K/MM3 (ref 0–0.1)
IMM GRANULOCYTES NFR BLD AUTO: 1 % (ref 0–1)
LYMPHOCYTES # BLD AUTO: 0.91 K/MM3 (ref 0.84–5.2)
LYMPHOCYTES NFR BLD AUTO: 6 % (ref 21–46)
MAGNESIUM SERPL-MCNC: 2.1 MG/DL (ref 1.6–2.4)
MCHC RBC AUTO-ENTMCNC: 33.8 G/DL (ref 31.5–36.5)
MCV RBC AUTO: 92 FL (ref 80–100)
MONOCYTES # BLD AUTO: 0.48 K/MM3 (ref 0.16–1.47)
MONOCYTES NFR BLD AUTO: 3 % (ref 4–13)
NEUTROPHILS # BLD AUTO: 13.17 K/MM3 (ref 1.96–9.15)
NEUTROPHILS NFR BLD AUTO: 89 % (ref 41–73)
NRBC # BLD AUTO: 0.02 K/MM3 (ref 0–0.02)
NRBC BLD AUTO-RTO: 0.1 /100 WBC (ref 0–0.2)
PHOSPHATE SERPL-MCNC: 3.8 MG/DL (ref 2.5–4.9)
PLATELET # BLD AUTO: 237 K/MM3 (ref 150–400)
POTASSIUM SERPL-SCNC: 4.5 MMOL/L (ref 3.5–5.5)
SODIUM SERPL-SCNC: 124 MMOL/L (ref 136–145)

## 2020-08-01 NOTE — NUR
HPI  Eric Liu is a 68 y.o. male who is here for follow up of diabetes known coronary artery disease as well as hypothyroidism and hyperlipidemia.  Overall seems to be doing extremely well on current regimen and was seen by cardiologist recently and told he can return on a yearly basis.  Most recent lab work did show slightly elevated TSH as well as A1c levels which will be rechecked.  Also is due for lipid panel and other tests.  Also hemoglobin slightly decreased.  Did have some type of benign tumor removed from: And is having some insurance issues as far as coverage etc.      Review of Systems   All other systems reviewed and are negative.        Past Medical History:   Diagnosis Date   • Acute bronchitis, unspecified    • Anemia    • Atherosclerotic heart disease of native coronary artery without angina pectoris    • CAD (coronary atherosclerotic disease)    • Chest pain    • Colon polyp    • Colonic mass    • High risk medication use    • History of transfusion    • Hyperlipidemia    • Hyperplasia of prostate without urinary obstruction    • Hypertension    • Hypothyroidism (acquired)    • Mass of hepatic flexure of colon    • Myocardial infarction    • Retinal hemorrhage    • Type 2 diabetes mellitus        Past Surgical History:   Procedure Laterality Date   • CARDIAC CATHETERIZATION Left 01/27/2011    Left heart catheterization, coronary angiogarphy, left ventriculography-Dr. Sánchez Casanova   • CATARACT EXTRACTION Bilateral    • COLON RESECTION Right 6/1/2017    Procedure: LAPAROSCOPIC RIGHT COLON RESECTION;  Surgeon: Pollo Liao MD;  Location: [facility];  Service:    • COLONOSCOPY N/A 01/31/2005    Colonic diverticulosis, internal hemorrhoids, and a few small colon polyps; Dr. Mushtaq Booth   • COLONOSCOPY N/A 1/24/2017    Procedure: COLONOSCOPY TO CECUM AND TERMINAL ILEUM WITH COLD BIOPSY AND HOT SNARE POLYPECTOMIES, INJECTED WITH NORMAL SALINE 3ML, INJECTED WITH SPOT INK 5ML, AND BIOPSIES;   Transfer to PCU:
 
Transfer order (PCU) received from Dr. Hernandez at approx 1430hr. Bed assignment
received, PCU-5. Report called to Arsalan BUSH in PCU. Patient transferred via bed
to PCU 5 without difficulty. Belonging's sent with patient to new room. Surgeon: Mushtaq Booth MD;  Location: [facility] ENDOSCOPY;  Service:    • COLONOSCOPY N/A 4/25/2017    Procedure: COLONOSCOPY to cecum and terminal ileum with cold polypectomy, and biopsies and tattoo (5cc) to mass at 75cm ;  Surgeon: Mushtaq Booth MD;  Location: [facility] ENDOSCOPY;  Service:    • CORONARY ARTERY BYPASS GRAFT N/A 02/08/2011    Transesophageal echo, endoscopic vein harvest, coronary artery bypass graft x5, left internal mammary graft to the LAD, vein graft to RV branch, vein graft ot distal posterior descending artery, second marginal branch of the circumflex, third marginal branch of the circumflex, temporary cardiopulmonary bypass, antegrade and retrograde cold blood cardioplegia, warm reperfusion-Dr. Brandon Bucio   • NOSE SURGERY      Laser Suite Ret Treatment Pan-Ablated Inferior Nasal Retina   • SHOULDER MANIPULATION Left 09/01/2004    Manipulation under anesthesia and injection of left shoulder-Dr. INA Ceja   • TONSILLECTOMY N/A        Family History   Problem Relation Age of Onset   • Heart attack Father    • Heart disease Father    • Hypertension Other    • Malig Hyperthermia Neg Hx        Social History     Social History   • Marital status:      Spouse name: N/A   • Number of children: N/A   • Years of education: N/A     Occupational History   • Not on file.     Social History Main Topics   • Smoking status: Former Smoker     Years: 20.00     Quit date: 3/8/1986   • Smokeless tobacco: Never Used   • Alcohol use Yes      Comment: occasionally   • Drug use: No   • Sexual activity: Defer     Other Topics Concern   • Not on file     Social History Narrative   • No narrative on file         Physical Exam   Constitutional: He is oriented to person, place, and time. He appears well-developed and well-nourished. No distress.   HENT:   Head: Normocephalic and atraumatic.   Nose: Nose normal.   Mouth/Throat: Oropharynx is clear and moist.   Eyes: EOM are normal. Pupils are equal, round,  and reactive to light.   Neck: Normal range of motion. Neck supple.   Cardiovascular: Normal rate, regular rhythm and normal heart sounds.    Pulmonary/Chest: Effort normal. No respiratory distress.   Abdominal: Soft. He exhibits no distension. There is no tenderness.   Musculoskeletal: He exhibits no edema or deformity.   Neurological: He is alert and oriented to person, place, and time. Coordination normal.   Skin: Skin is warm and dry.   Psychiatric: He has a normal mood and affect. His behavior is normal. Judgment and thought content normal.   Nursing note and vitals reviewed.        Assessment/Plan    Diagnoses and all orders for this visit:    Coronary arteriosclerosis in native artery  -     Lipid Panel    Hyperlipidemia, unspecified hyperlipidemia type  -     Lipid Panel    Essential hypertension  -     Comprehensive Metabolic Panel    Acquired hypothyroidism  -     TSH+Free T4    Controlled type 2 diabetes mellitus without complication, with long-term current use of insulin  -     Hemoglobin A1c    High risk medication use  -     CBC & Differential  -     Comprehensive Metabolic Panel    Anemia, unspecified type  -     CBC & Differential      Patient is here for routine checkup of above-noted medical problems.  Overall seems to be doing extremely well on current regimen which will be continued.  Will follow-up in 3 months especially until diabetes comes under better control.    This note includes information entered using a voice recognition dictation system.  Though reviewed, some nonsensible errors may remain.

## 2020-08-01 NOTE — NUR
[place] of Care:
 
Care assumed at 0700hr. Patient alert and oriented x3, sitting upright in bed,
with visitor at bedside. C/o pain to legs, knees, and leg wounds. x1 prn
Ultram given shortly after shift change, will monitor for effect. Denies
dyspnea/SOB, spO2 98% on RA, VSS. Plan to cleanse leg wounds this morning, but
patient refuses gauze dressings as they are painful. Pulses to BLE's positive
per doppler. Purple discoloration persist to bilateral toes, but no change r/t
7/31/20. Central line to rt IJ remains patent and intact, infusing without
difficulty. Quiros cath remains patent and intact, draining clear yellow urine.
Call light in reach, makes needs known. Will continue to monitor.

## 2020-08-01 NOTE — NUR
PT JOAQUIN COOP A/O QUIET. PT REP0RT RECEIVED FROM SARAH ICU RN. SETTLED TO
BED. BED IN LOW POSITION, CALL LITE IN REACH, CALLS APPROP

## 2020-08-01 NOTE — NUR
SHIFT SUMMARY
 
NO ACUTE CHANGES THIS SHIFT, VITAL SIGNS REMAIN STABLE OFF OF PRESSORS AND ON
ROOM AIR. PT MEDICATED FOR PAIN x1, ADEQUATE FOR PAIN CONTROL AND PT SLEPT
WELL T/O THE NIGHT. SIGNIFICANT PAIN REMAINS TO BLE, WORSE TO THE L LEG AND
WORSE WITH TOUCH/REPOSITIONING. PT ASKED THIS RN HOW LONG THE PAIN TO HER LEGS
WAS GOING TO LAST, EXPLAINED HEALING PROCESS AND HOW DECREASED EFFICIENCY OF
TH HEART CAN INCREASE HEALING TIME. PT WITH VERY SMALL/SMEAR BM THIS SHIFT,
RUST COLORED. WITT IN PLACE AND DRAINING YELLOW URINE, APPROX 500ml THIS
SHIFT. CALL LIGHT REMAINS IN PLACE, PT USES APPROPRIATELY.

## 2020-08-01 NOTE — NUR
Spoke with Bedside RN Marek and discussed case prior to visiting with Pt.
 
Pt is resting in bed upon arrival. Pt reports current pain regimen is managing
her pain. Engaged in therapeutic discussion regarding goals of care. Offered
therapeutic listening and assessed Pt's understanding of her chronic illness.
Educated on disease process including trajectory of disease. Explored Pt's
goals and values. Discussed quality of life and comfort. Pt states she wants
to spend her remaining days at home focusing on comfort. Educated on hospice
philosophy with V/U made by Pt. Discussed the possibility of needing
assistance with caregivers in the home or possibly needing a higher level of
care. Pt reports her wishes are to be at home and would prefer not to go to
assisted living. Discussed adult foster homes with little response from Pt. Pt
gives this RN verbal permission to update her roommate Maribel and her
brother Denis of her wishes and goals. Pt report no other concerns at this
time.
 
Spoke with Dr Hernandez and discussed case. Dr Hernandez is in agreement with
hospice.
 
Spoke with Dr Fletcher and she is in agreement with hospice.
 
Called and spoke with roommate Maribel. Discussed Pt's goals and wishes.
Offered therapeutic listening. Maribel reports due to her work schedule she
will not be able to provide care for the Pt.
 
Called and spoke with Pt brother Denis. Updated Denis of Pt's wishes.
Engaged in therapeutic discussion regarding disease process. Answered
questions and offered therapeutic listening. Denis expresses appreciation of
call and reports no other concerns at this time.
 
Placed hospice referral.
 
Palliative Care will remain available for therapeutic visits.

## 2020-08-01 NOTE — NUR
PT RECEIVED FROM ICU 1530. PT STATES PAIN MANAGED WITH AVIAL MEDS. LEGS ARE
WEEPING S/S MOSTLY YELLOW FLUID. ABLE TO TAKE PILLS WHOLE WITH WATER. ON CLEAR
LIQUID DIET. ABLE TO MAKE NEEDS KNOWN. IJ CENTRAL LINE ON TKO FLUIDS. BROWN
QUITE SMALL STOOL DURING TRANPORT TO BED FROM ICU. WITT DRAINING CLEAR YELLOW
FLUID. BED IN LOW POSITION, CALL LITE IN REACH, CALLS APPROP

## 2020-08-02 LAB
ALBUMIN SERPL BCP-MCNC: 2.1 G/DL (ref 3.4–5)
ANION GAP SERPL CALCULATED.4IONS-SCNC: 13 MMOL/L (ref 6–16)
BASOPHILS # BLD AUTO: 0.09 K/MM3 (ref 0–0.23)
BASOPHILS NFR BLD AUTO: 1 % (ref 0–2)
BUN SERPL-MCNC: 32 MG/DL (ref 8–24)
CALCIUM SERPL-MCNC: 7.9 MG/DL (ref 8.5–10.1)
CHLORIDE SERPL-SCNC: 88 MMOL/L (ref 98–108)
CO2 SERPL-SCNC: 22 MMOL/L (ref 21–32)
CREAT SERPL-MCNC: 1.51 MG/DL (ref 0.4–1)
DEPRECATED RDW RBC AUTO: 60.3 FL (ref 35.1–46.3)
EOSINOPHIL # BLD AUTO: 0.14 K/MM3 (ref 0–0.68)
EOSINOPHIL NFR BLD AUTO: 1 % (ref 0–6)
ERYTHROCYTE [DISTWIDTH] IN BLOOD BY AUTOMATED COUNT: 17 % (ref 11.7–14.2)
GLUCOSE SERPL-MCNC: 58 MG/DL (ref 70–99)
HCT VFR BLD AUTO: 44.1 % (ref 33–51)
HGB BLD-MCNC: 14.2 G/DL (ref 11.5–16)
IMM GRANULOCYTES # BLD AUTO: 0.07 K/MM3 (ref 0–0.1)
IMM GRANULOCYTES NFR BLD AUTO: 1 % (ref 0–1)
LYMPHOCYTES # BLD AUTO: 1.11 K/MM3 (ref 0.84–5.2)
LYMPHOCYTES NFR BLD AUTO: 9 % (ref 21–46)
MAGNESIUM SERPL-MCNC: 1.8 MG/DL (ref 1.6–2.4)
MCHC RBC AUTO-ENTMCNC: 32.2 G/DL (ref 31.5–36.5)
MCV RBC AUTO: 98 FL (ref 80–100)
MONOCYTES # BLD AUTO: 0.55 K/MM3 (ref 0.16–1.47)
MONOCYTES NFR BLD AUTO: 4 % (ref 4–13)
NEUTROPHILS # BLD AUTO: 10.95 K/MM3 (ref 1.96–9.15)
NEUTROPHILS NFR BLD AUTO: 85 % (ref 41–73)
NRBC # BLD AUTO: 0 K/MM3 (ref 0–0.02)
NRBC BLD AUTO-RTO: 0 /100 WBC (ref 0–0.2)
PHOSPHATE SERPL-MCNC: 4.1 MG/DL (ref 2.5–4.9)
PLATELET # BLD AUTO: 210 K/MM3 (ref 150–400)
POTASSIUM SERPL-SCNC: 4.1 MMOL/L (ref 3.5–5.5)
SODIUM SERPL-SCNC: 123 MMOL/L (ref 136–145)

## 2020-08-02 NOTE — NUR
ASSUMED CARE
 
REPORT RECEIVED, CARE ASSUMED AT 0700 FROM ELEAZAR HAAS. PT RESTING IN BED
QUIETLY, AROUSES EASILY FOR ASSESSMENT. PT REPORTS "10/10 PAIN," BUT DECLINES
MEDICATIONS. PT STATES, "I WILL BE OUT ALL DAY IF I DO THAT. I WILL LET YOU
KNOW IF IT GETS TOO BAD." PT HYPOTENSIVE. DISCUSSED CONCERNS WITH PT. PT
STATES SHE WISHES TO CONTINUE WITH TREATMENT UNTIL HER BROTHER CAN VISIT HER
FROM [place], BUT THAT WHEN SHE IS DISCHARGED SHE WANTS TO BE ON HOSPICE. SPOKE
WITH YAMILEX IN PALLIATIVE CARE WHO STATES HE SPOKE WITH PT'S BROTHER YESTERDAY
AND THAT HE IS NOT INTENDING ON COMING. YAMILEX TO BEDSIDE TO DISCUSS WITH PT
HER WISHES. SEE PALLIATIVE CARE NOTE. SINCE THEN, PT HAS CONTINUED TO BE
SLEEPY. DISCUSS NEED FOR REPOSITIONING Q2H WITH PT AND SHE REPEATEDLY
DECLINES, STATING, "NO, I AM COMFORTABLE." BP CONTINUES TO BE LOW BUT
CONSISTENT. PT CONVERTED INTO AFIB. DISCUSSED LOW BP, HOLDING OF BUMEX,
CONVERSION TO AFIB, AND PT'S UPDATED WISHES PER PALLIATIVE CARE NOTE. NO NEW
ORDERS AT THIS TIME. PT CONTINUES TO DECLINE PAIN MEDICATION STATING, "I AM
JUST TOO SLEEPY."

## 2020-08-02 NOTE — NUR
Spoke with Bedside ELEAZAR Catherine prior to visiting with Pt. Dorene expresses
concerns regarding Pt's blood pressure. Discussed the possibility of needing
to be transfered back to ICU if pressure continues to drop.
 
Pt resting in bed upon arrival. Pt reports 8/10 pain in her legs. Pt just
received pain medication. Will review effectiveness as needed. Engaged in
therapeutic discussion regarding goals of care. Pt reports wishes remain the
same of wanting to D/C home with hospice. Discussed her goals during hospital
stay. Discussed concerns regarding her blood pressure. Discussed option
including continued supportive treatment and transfering back to ICU as needed
or comfort care. Pt reports she would like to continue supportive treatment
and if her condition declines she does not want to be transfered back to ICU
and would like to be placed on comfort care. Educated on comfort care
philosophy with V/U made by Pt. Pt reports no other concerns at this time. Pt
expresses appreciation of visit.
 
Spoke to Bedside ELEAZAR Catherine and relayed Pt's wishes.
 
Palliative Care will remain available.

## 2020-08-02 NOTE — NUR
ASSUMED CARE OF PATIENT AT APPROXIMATELY 1905 FROM JOHNNIE DAHL RN.  PATIENT
ALERT AND ORIENTED; SLOW TO RESPOND AT TIMES.  PATIENT REPORTS PAIN BUT
REPORTS NOT BAD ENOUGH FOR PAIN MEDS AT THIS TIME; WILL CALL WHEN SHE WOULD
NEED MEDICATION.  PATIENT DENIES DIZZINESS OR NAUSEA.  AFIB ON TELE; OXYGEN
SATURATION ABOVE 90% ON ROOM AIR.  CENTRAL LINE TKO.  URINARY CATH PATENT.
BILAT LEGS WHEEPING; PAD PLACED UNDER LEGS.
PATIENT CURRENTLY RESTING IN BED; CALL LIGHT IN REACH; BED IN LOWEST
POSISTION; BED ALARM ON; WILL CONTINUE TO MONITOR AND ASSESS UNTIL END OF
SHIFT.

## 2020-08-02 NOTE — NUR
SHIFT SUMMARY
 
PT A&O X4. VSS. MONITOR SHOWS SB-SR, HR 50's-70's. SPO2 > 92% ON RA. PT C/O
PAIN IN BILAT LEGS, MEDICATED PER PT REQUEST/EMAR X2 THIS SHIFT W/ PT REPORT
OF IMPROVEMENT. BLE NOTED TO BE RED, HOT, SWOLLEN, & WEEPING CLEAR/YELLOW
DRAINAGE. MULT WOUNDS NOTED TO BLE & COCCYX, SEE WOUND PHOTOS IN CHART. PEDAL
PULSES PRESENT USING DOPPLER. WITT CATH PATENT & DRAINING CLEAR YELLOW URINE.
PT W/ LOOSE, BROWN STOOLS THIS SHIFT. Q2H REPOSITIONING W/ PRN JAVED/CATH CARE
& ATTENDS CHANGES. NO EVENTS OVER NIGHT. WILL CONTINUE TO MONITOR & PROVIDE
CARE UNTIL REPORT OFF TO DAY SHIFT RN.

## 2020-08-02 NOTE — NUR
SUMMARY
 
SINCE PREVIOUS NOTE, PT HAS CONTINUED TO BE SOMEWHAT SLEEPY, BUT AROUSES
EASILY FOR ASSESSMENTS. TALKATIVE, WATCHING TV INTERMITTENTLY, AND CALLING
APPROPRIATELY FOR NEEDS. PT HAS CONTINUED TO DENY PAIN MEDICINE REGARDLESS OF
PAIN BUT STATES, "I WILL LET YOU KNOW." PT VERY PARTICULAR ABOUT
REPOSITIONING. ALLOWS FOR CARES WHEN SHE WANTS. BILATERAL LEGS WEEPING, LINENS
CHANGED AS NEEDED. BP IMPROVED THIS AFTERNOON. CONTINUES TO BE IN AFIB WITH
CONTROLLED RATE. SPO2 90'S ON ROOM AIR. AFEBRILE. GOOD URINE OUTPUT FROM
WITT. SMALL BOWEL MOVEMENTS TODAY. PT HAS MODERATE APPETITE.

## 2020-08-03 LAB
ALBUMIN SERPL BCP-MCNC: 2 G/DL (ref 3.4–5)
ANION GAP SERPL CALCULATED.4IONS-SCNC: 9 MMOL/L (ref 6–16)
BUN SERPL-MCNC: 25 MG/DL (ref 8–24)
CALCIUM SERPL-MCNC: 7.9 MG/DL (ref 8.5–10.1)
CHLORIDE SERPL-SCNC: 91 MMOL/L (ref 98–108)
CO2 SERPL-SCNC: 29 MMOL/L (ref 21–32)
CREAT SERPL-MCNC: 1.19 MG/DL (ref 0.4–1)
GLUCOSE SERPL-MCNC: 65 MG/DL (ref 70–99)
HCT VFR BLD AUTO: 39.5 % (ref 33–51)
HGB BLD-MCNC: 13.1 G/DL (ref 11.5–16)
MAGNESIUM SERPL-MCNC: 1.6 MG/DL (ref 1.6–2.4)
PHOSPHATE SERPL-MCNC: 3.7 MG/DL (ref 2.5–4.9)
POTASSIUM SERPL-SCNC: 2.6 MMOL/L (ref 3.5–5.5)
SODIUM SERPL-SCNC: 129 MMOL/L (ref 136–145)

## 2020-08-03 NOTE — NUR
SHIFT SUMMARY
PT ALERT AND ORIENTED. PT LETHARGIC THIS SHIFT, BUT EASILY AWAKENS. VS STABLE.
HR AFIB. O2 SATS REMAIN ABOVE 90% ON RA. PT REFUSING REPOSITIONING, BUT
ENCOURAGED TO MOVE TO PREVENT PRESSURE ULCERS. WEEPING EDEMA TO BLE. LEGS
ELEVATED ON PILLOWS. WILL CONTINUE TO MONITOR AND REPORT TO ONCOMING RN. CALL
LIGHT IN REACH.

## 2020-08-03 NOTE — NUR
PATIENT SLEPT ABOUT NINE HOURS.  VSS.  SEE PREVIOUS NOTES FOR UPDATES.  WILL
CONTINUE TO MONITOR AND ASSESS UNTIL END OF SHIFT.

## 2020-08-03 NOTE — NUR
DR. CHU BEDSIDE; ORDERS FOR 20MEQ IV POTASSIUM CHLORIDE; STAT POTASSIUM LAB
AT 1100; RN TO CALL WITH LAB RESULTS BY 1200 AND DECREASE BUMEX FROM BIDD TO
DAILY.  WILL CONTINUE TO MONITOR AND ASSESS UNTIL END OF SHIFT.

## 2020-08-04 LAB
ALBUMIN SERPL BCP-MCNC: 2 G/DL (ref 3.4–5)
ANION GAP SERPL CALCULATED.4IONS-SCNC: 9 MMOL/L (ref 6–16)
BUN SERPL-MCNC: 18 MG/DL (ref 8–24)
CALCIUM SERPL-MCNC: 8 MG/DL (ref 8.5–10.1)
CHLORIDE SERPL-SCNC: 93 MMOL/L (ref 98–108)
CO2 SERPL-SCNC: 32 MMOL/L (ref 21–32)
CREAT SERPL-MCNC: 1.11 MG/DL (ref 0.4–1)
GLUCOSE SERPL-MCNC: 47 MG/DL (ref 70–99)
HCT VFR BLD AUTO: 42 % (ref 33–51)
HGB BLD-MCNC: 13.5 G/DL (ref 11.5–16)
MAGNESIUM SERPL-MCNC: 1.4 MG/DL (ref 1.6–2.4)
MAGNESIUM SERPL-MCNC: 1.9 MG/DL (ref 1.6–2.4)
PHOSPHATE SERPL-MCNC: 3.1 MG/DL (ref 2.5–4.9)
POTASSIUM SERPL-SCNC: 3 MMOL/L (ref 3.5–5.5)
POTASSIUM SERPL-SCNC: 3.8 MMOL/L (ref 3.5–5.5)
SODIUM SERPL-SCNC: 134 MMOL/L (ref 136–145)

## 2020-08-04 NOTE — NUR
TRANSFER NOTE
 
PT MEDICAL W/ TELE STATUS, A&O X4. VSS. MONITOR SHOWING AFIB, 's. SPO2 >
92% ON RA. BLE RED & SWOLLEN W/ WOUNDS NOTED TO BLE & COCCYX, SEE WOUND PHOTOS
IN CHART. BLE WEEPING YELLOW DRAINAGE W/ LEFT LEG DRAINING MORE THAN RIGHT.
REPORT CALLED TO SURGICAL FLOOR RN ACCEPING PT @ APPROX 2140. PT TRANSFERRED
TO  IN PT BED W/ BELONGINGS, BY 2 PCT's @ APPROX 2150.

## 2020-08-04 NOTE — NUR
PT ARRIVED TO UNIT FROM PCU AT APPROX 2200. INITIAL ADMITTING DIAGNOSIS ACUTE
RESPIRATORY FAILURE. . ALL OTHER VS WNL. O2 93% ON RA. PT DENIES SOB.
LUNGS CLEAR THROUGHOUT. REDNESS AND 3+ PITTING EDEMA NOTED TO BLE. SEVERAL
OOZING ULCERATIONS TO BLE ALSO NOTED. DRG YELLOW IN COLOR. FLUIDS TKO. PT
ORIENTED TO ROOM AND DENIES FURTHER NEEDS AT THIS TIME. CALL LIGHT IN REACH.

## 2020-08-04 NOTE — NUR
SHIFT SUMMARY
PT ALERT AND ORIENTED. VS STABLE. HR AFIB 100-120. BP STABLE. O2 SATS REMAIN
ABOVE 90% ON RA. PT ABLE TO AMBULATE TO CHAIR WITH 2 PERSON ASSIST AND FWW
THIS SHIFT. PT UP TO RECLINER MOST OF SHIFT WITH LEGS ELEVATED ON PILLOWS. BLE
WITH WEEPING EDEMA. PT COMPLAINS OF PAIN WITH MOVEMENT THAT RESOLVES WITH
REST. WILL CONTINUE TO MONITOR AND REPORT TO ONCOMING RN. CALL LIGHT IN REACH.

## 2020-08-04 NOTE — NUR
SHIFT SUMMARY
PT SLEEPING IN ROOM COMFORTABLY AT THIS TIME. NO ACUTE CHANGES IN STATUS T/O
NIGHT. PT SLEPT WELL. WOKE ONCE FOR PAIN MEDS. PT MEDICATED PER EMAR. PT DENED
ANY CP OR SOB. RESP EVEN UNLABORED ON RA W/ SATS >92%. PT TURNED FOR COMFORT.
REFUSED SOME TURNS D/T COMFORTABLE AND WANTED TO SLEEP. DENIED OTHER NEEDS.
CALL LIGHT IN REACH.

## 2020-08-05 LAB
ALBUMIN SERPL BCP-MCNC: 2 G/DL (ref 3.4–5)
ALBUMIN SERPL BCP-MCNC: 2 G/DL (ref 3.4–5)
ALBUMIN/GLOB SERPL: 0.7 {RATIO} (ref 0.8–1.8)
ALT SERPL W P-5'-P-CCNC: 25 U/L (ref 12–78)
ANION GAP SERPL CALCULATED.4IONS-SCNC: 6 MMOL/L (ref 6–16)
AST SERPL W P-5'-P-CCNC: 29 U/L (ref 12–37)
BILIRUB DIRECT SERPL-MCNC: 0.8 MG/DL (ref 0–0.3)
BILIRUB INDIRECT SERPL-MCNC: 0.6 MG/DL (ref 0.1–0.7)
BILIRUB SERPL-MCNC: 1.4 MG/DL (ref 0.1–1)
BUN SERPL-MCNC: 14 MG/DL (ref 8–24)
CALCIUM SERPL-MCNC: 8.1 MG/DL (ref 8.5–10.1)
CHLORIDE SERPL-SCNC: 94 MMOL/L (ref 98–108)
CO2 SERPL-SCNC: 34 MMOL/L (ref 21–32)
CREAT SERPL-MCNC: 0.95 MG/DL (ref 0.4–1)
GLOBULIN SER CALC-MCNC: 3 G/DL (ref 2.2–4)
GLUCOSE SERPL-MCNC: 72 MG/DL (ref 70–99)
HCT VFR BLD AUTO: 44.9 % (ref 33–51)
HGB BLD-MCNC: 14.4 G/DL (ref 11.5–16)
MAGNESIUM SERPL-MCNC: 1.8 MG/DL (ref 1.6–2.4)
PHOSPHATE SERPL-MCNC: 2.7 MG/DL (ref 2.5–4.9)
POTASSIUM SERPL-SCNC: 3.4 MMOL/L (ref 3.5–5.5)
PROT SERPL-MCNC: 5 G/DL (ref 6.4–8.2)
SODIUM SERPL-SCNC: 134 MMOL/L (ref 136–145)

## 2020-08-05 NOTE — NUR
SHIFT SUMMARY
PT A&OX4, VSS, RA, DENIES SOB, TCDB & I.S. EDU & ENC Q1H, DRESSINGS APPLIED TO
OPEN WOUNDS ON LEGS AT BEGINNING OF SHIFT AND CHANGED AT END OF SHIFT, LYLY
ELBOWS DRESSINGS APPLIED. BEDBATH AND WITT CARE PROVIDED. PT/OT WORKED WITH
PT; PT AMBULATED TO BRP. WITT PATENT & DRAINING YELLOW URINE, STAT LOCK IN
PLACE, OFF FLOOR. IJ IN RIGHT NECK, IVF @ TKA AND ABX INFUSED PER EMAR. WILL
REPORT TO ONCOMING NOC RN.

## 2020-08-06 LAB
ALBUMIN SERPL BCP-MCNC: 1.8 G/DL (ref 3.4–5)
ANION GAP SERPL CALCULATED.4IONS-SCNC: 5 MMOL/L (ref 6–16)
BASOPHILS # BLD AUTO: 0.11 K/MM3 (ref 0–0.23)
BASOPHILS NFR BLD AUTO: 1 % (ref 0–2)
BUN SERPL-MCNC: 15 MG/DL (ref 8–24)
CALCIUM SERPL-MCNC: 7.8 MG/DL (ref 8.5–10.1)
CHLORIDE SERPL-SCNC: 94 MMOL/L (ref 98–108)
CO2 SERPL-SCNC: 35 MMOL/L (ref 21–32)
CREAT SERPL-MCNC: 1.07 MG/DL (ref 0.4–1)
DEPRECATED RDW RBC AUTO: 59.3 FL (ref 35.1–46.3)
EOSINOPHIL # BLD AUTO: 0.69 K/MM3 (ref 0–0.68)
EOSINOPHIL NFR BLD AUTO: 7 % (ref 0–6)
ERYTHROCYTE [DISTWIDTH] IN BLOOD BY AUTOMATED COUNT: 17.2 % (ref 11.7–14.2)
GLUCOSE SERPL-MCNC: 82 MG/DL (ref 70–99)
HCT VFR BLD AUTO: 39.2 % (ref 33–51)
HGB BLD-MCNC: 12.7 G/DL (ref 11.5–16)
IMM GRANULOCYTES # BLD AUTO: 0.09 K/MM3 (ref 0–0.1)
IMM GRANULOCYTES NFR BLD AUTO: 1 % (ref 0–1)
LYMPHOCYTES # BLD AUTO: 1.54 K/MM3 (ref 0.84–5.2)
LYMPHOCYTES NFR BLD AUTO: 17 % (ref 21–46)
MAGNESIUM SERPL-MCNC: 1.5 MG/DL (ref 1.6–2.4)
MCHC RBC AUTO-ENTMCNC: 32.4 G/DL (ref 31.5–36.5)
MCV RBC AUTO: 95 FL (ref 80–100)
MONOCYTES # BLD AUTO: 0.8 K/MM3 (ref 0.16–1.47)
MONOCYTES NFR BLD AUTO: 9 % (ref 4–13)
NEUTROPHILS # BLD AUTO: 6.09 K/MM3 (ref 1.96–9.15)
NEUTROPHILS NFR BLD AUTO: 65 % (ref 41–73)
NRBC # BLD AUTO: 0 K/MM3 (ref 0–0.02)
NRBC BLD AUTO-RTO: 0 /100 WBC (ref 0–0.2)
PHOSPHATE SERPL-MCNC: 2.8 MG/DL (ref 2.5–4.9)
PLATELET # BLD AUTO: 142 K/MM3 (ref 150–400)
POTASSIUM SERPL-SCNC: 3.4 MMOL/L (ref 3.5–5.5)
SODIUM SERPL-SCNC: 134 MMOL/L (ref 136–145)

## 2020-08-06 NOTE — NUR
SHIFT SUMMARY
PT A&OX4, VSS, RA, TELE AFIB 98, WITT PATENT & DRAINING YELLOW URINE, STAT
LOCK IN PLACE, OFF FLOOR. DRESSINGS CHANGED THIS SHIFT. UP TO CHAIR, AMBULATED
TO BRP AND IN HALLWAYS WITH SBA. DESIRAE PO. EXT DWELLING PLACED IN [place];
CENTRALINE TO BE REMOVED TONIGHT. WILL REPORT TO ONCOMING TY RN.

## 2020-08-06 NOTE — NUR
CENTRAL LINE DC'D. FIRM PRESSURE HELD FOR 20MIN. NO BLEEDING FROM
SITE. VASELINE GAUZE AND OPSITE APPLIED.

## 2020-08-06 NOTE — NUR
SHIFT SUMMARY
PT A/O X4. PT HAS BEEN REPOSITIONED THROUGHOUT THE SHIFT WITH LEGS ELEVATED ON
PILLOWS. WHEEPING EDEMA TO BLE; DRESSINGS IN PLACE. PAIN MANAGED WITH ULTRAM
PER ORDER; SEE TIMBO WITT PATENT, STAT LOCK IN PLACE. FLUIDS HAVE BEEN
RUNNING TKO THROUGH CENTRAL LINE DURING THE SHIFT. PT TOLERATING PO INTAKE. NO
ACUTE CHANGES.

## 2020-08-07 LAB
ALBUMIN SERPL BCP-MCNC: 1.8 G/DL (ref 3.4–5)
ANION GAP SERPL CALCULATED.4IONS-SCNC: 7 MMOL/L (ref 6–16)
BASOPHILS # BLD AUTO: 0.17 K/MM3 (ref 0–0.23)
BASOPHILS NFR BLD AUTO: 2 % (ref 0–2)
BUN SERPL-MCNC: 14 MG/DL (ref 8–24)
CALCIUM SERPL-MCNC: 7.9 MG/DL (ref 8.5–10.1)
CHLORIDE SERPL-SCNC: 94 MMOL/L (ref 98–108)
CO2 SERPL-SCNC: 31 MMOL/L (ref 21–32)
CREAT SERPL-MCNC: 1.07 MG/DL (ref 0.4–1)
DEPRECATED RDW RBC AUTO: 58.6 FL (ref 35.1–46.3)
EOSINOPHIL # BLD AUTO: 0.61 K/MM3 (ref 0–0.68)
EOSINOPHIL NFR BLD AUTO: 7 % (ref 0–6)
ERYTHROCYTE [DISTWIDTH] IN BLOOD BY AUTOMATED COUNT: 17.1 % (ref 11.7–14.2)
GLUCOSE SERPL-MCNC: 77 MG/DL (ref 70–99)
HCT VFR BLD AUTO: 39.7 % (ref 33–51)
HGB BLD-MCNC: 12.9 G/DL (ref 11.5–16)
IMM GRANULOCYTES # BLD AUTO: 0.13 K/MM3 (ref 0–0.1)
IMM GRANULOCYTES NFR BLD AUTO: 2 % (ref 0–1)
LYMPHOCYTES # BLD AUTO: 1.76 K/MM3 (ref 0.84–5.2)
LYMPHOCYTES NFR BLD AUTO: 20 % (ref 21–46)
MCHC RBC AUTO-ENTMCNC: 32.5 G/DL (ref 31.5–36.5)
MCV RBC AUTO: 95 FL (ref 80–100)
MONOCYTES # BLD AUTO: 0.77 K/MM3 (ref 0.16–1.47)
MONOCYTES NFR BLD AUTO: 9 % (ref 4–13)
NEUTROPHILS # BLD AUTO: 5.48 K/MM3 (ref 1.96–9.15)
NEUTROPHILS NFR BLD AUTO: 62 % (ref 41–73)
NRBC # BLD AUTO: 0 K/MM3 (ref 0–0.02)
NRBC BLD AUTO-RTO: 0 /100 WBC (ref 0–0.2)
PHOSPHATE SERPL-MCNC: 3 MG/DL (ref 2.5–4.9)
PLATELET # BLD AUTO: 159 K/MM3 (ref 150–400)
POTASSIUM SERPL-SCNC: 3.6 MMOL/L (ref 3.5–5.5)
SODIUM SERPL-SCNC: 132 MMOL/L (ref 136–145)

## 2020-08-07 NOTE — NUR
SUMMARY
PT ABLE TO SHOWER WITH MINIMAL ASSIST AND SAT IN CHAIR FOR SEVERAL HOURS. LEGS
EDEMATOUS AND OOZING SEROUS FLUIDS. PT STATES THAT PRIOR TO BED SHE WILL ALLOW
DRESSINGS TO BE APPLIED TO BUTTOCKS.

## 2020-08-07 NOTE — NUR
SHIFT SUMMARY
PT IS A/O X4. STARTED SHIFT UP IN CHAIR, NEEDED 1 ASSIST BACK TO BED. LEGS
ELEVATED ON PILLOWS. PT HAS DENIED NEED FOR PAIN MEDS SO FAR DURING THE NIGHT.
CENTRAL LINE WAS DC'D THIS SHIFT BY TRAINED RN. NO COMPLICATIONS NOTED.
DRESSING IN PLACE AT SITE. NO ACUTE CHANGES OTHERWISE.

## 2020-08-08 LAB
ALBUMIN SERPL BCP-MCNC: 2 G/DL (ref 3.4–5)
ANION GAP SERPL CALCULATED.4IONS-SCNC: 8 MMOL/L (ref 6–16)
BUN SERPL-MCNC: 14 MG/DL (ref 8–24)
CALCIUM SERPL-MCNC: 8.1 MG/DL (ref 8.5–10.1)
CHLORIDE SERPL-SCNC: 95 MMOL/L (ref 98–108)
CO2 SERPL-SCNC: 30 MMOL/L (ref 21–32)
CREAT SERPL-MCNC: 1.04 MG/DL (ref 0.4–1)
GLUCOSE SERPL-MCNC: 74 MG/DL (ref 70–99)
HCT VFR BLD AUTO: 39.4 % (ref 33–51)
HGB BLD-MCNC: 12.9 G/DL (ref 11.5–16)
MAGNESIUM SERPL-MCNC: 1.8 MG/DL (ref 1.6–2.4)
PHOSPHATE SERPL-MCNC: 2.9 MG/DL (ref 2.5–4.9)
POTASSIUM SERPL-SCNC: 3.6 MMOL/L (ref 3.5–5.5)
SODIUM SERPL-SCNC: 133 MMOL/L (ref 136–145)

## 2020-08-08 NOTE — NUR
SUMMARY
NO ACUTE CHANGES T/O SHIFT.  WITT CATH DRAINING YELLOW URINE.  PT REPORTED
BM.  AMBULATES TO RESTROOM W/1 PERSON ASSIST, GAIT BELT AND FWW.  CALL LIGHT
IN REACH.

## 2020-08-08 NOTE — NUR
SHIFT SUMMARY:
 
PT A&O X4. VS WNL THROUGHOUT SHIFT. WOUNDS TO BLE WRAPPED WITH NON ADHERENT
DRESSINGS, KERLEX AND NETTING. ULCER TO LLE DRAINING LARGE AMOUNT OF SEROUS
FLUID. 3+ PITTING EDEMA. REDNESS TO BLE IMPROVING. BOTH EXTREMITIES ELEVATED.
MEPILEX DRESSINGS PLACED TO ULCERS ON BUTTOCKS. PT AMBULATING TO BATHROOM WITH
FWW AND MINIMAL ASSIST. ONE BM THIS SHIFT. PT REPOSITIONING SELF IN BED WELL.
WITT PATENT AND DRAINING YELLOW URINE. FLUIDS TKO. ABX INFUSING PER EMAR.

## 2020-08-09 LAB
ALBUMIN SERPL BCP-MCNC: 2 G/DL (ref 3.4–5)
ANION GAP SERPL CALCULATED.4IONS-SCNC: 6 MMOL/L (ref 6–16)
BUN SERPL-MCNC: 15 MG/DL (ref 8–24)
CALCIUM SERPL-MCNC: 8.2 MG/DL (ref 8.5–10.1)
CHLORIDE SERPL-SCNC: 95 MMOL/L (ref 98–108)
CO2 SERPL-SCNC: 31 MMOL/L (ref 21–32)
CREAT SERPL-MCNC: 1.09 MG/DL (ref 0.4–1)
GLUCOSE SERPL-MCNC: 83 MG/DL (ref 70–99)
HCT VFR BLD AUTO: 40.7 % (ref 33–51)
HGB BLD-MCNC: 13.1 G/DL (ref 11.5–16)
MAGNESIUM SERPL-MCNC: 1.8 MG/DL (ref 1.6–2.4)
PHOSPHATE SERPL-MCNC: 3.2 MG/DL (ref 2.5–4.9)
POTASSIUM SERPL-SCNC: 4.3 MMOL/L (ref 3.5–5.5)
SODIUM SERPL-SCNC: 132 MMOL/L (ref 136–145)

## 2020-08-09 NOTE — NUR
SHIFT SUMMARY
 
LYING IN SEMI FOWLERS WITH EYES OPEN WHILE WATCHING TV.  AAO X3, HAS RESTED
WELL.  RESPIRATIONS EVEN AND UNLABORED ON ROOM AIR.  LUNG SOUNDS CLEAR AND
DIMINSHED IN BASES BILATERALLY.  ABDOMEN SOFT AND NONDISTENDED.  BOWEL SOUNDS
PRESENT IN ALL QUADS.  LEFT UPPER ARM POWERGLIDE IS PATENT, FLUSHING WITH EASE
AND GOOD BLOOD RETURN NOTED.  AM LABS DRAWN FROM POWERGLIDE AND SENT TO LAB.
BLE PLACED ON PILLOWS WITH DRESSING C/D/I, +3/+2 EDEMA NOTED.  WITT CATH IS
PATENT, DRAINING CLEAR YELLOW URINE TO GRAVITY.  DENIES PAIN, DISCOMFORT, OR
FURTHER NEEDS AT THIS TIME.  SAFETY MEASURES IN PLACE.  WILL CONTINUE TO
MONITOR AND GIVE HAND OFF TO ONCOMING SHIFT USING SBAR DURING BEDSIDE REPORT.

## 2020-08-09 NOTE — NUR
SUMMARY
NO ACUTE CHANGES T/O SHIFT.  CHANGED DRESSINGS TO BLE.  PT FEELS ULCERS TO BLE
"LOOK WORSE."  TOOK PHOTOS AND PLACED IN CHART.  CHANGED DRESSINGS TO ELBOWS.
PERFORMED BLADDER TRAINING AND DC'D WITT CATH PER DR CHU'S ORDERS.  PT 1
SBA TO RESTROOM.  HAD MULTIPLE BMS TODAY.  CALL LIGHT IN REACH.

## 2020-08-10 LAB
ALBUMIN SERPL BCP-MCNC: 2.1 G/DL (ref 3.4–5)
ANION GAP SERPL CALCULATED.4IONS-SCNC: 9 MMOL/L (ref 6–16)
BUN SERPL-MCNC: 16 MG/DL (ref 8–24)
CALCIUM SERPL-MCNC: 8.2 MG/DL (ref 8.5–10.1)
CHLORIDE SERPL-SCNC: 96 MMOL/L (ref 98–108)
CO2 SERPL-SCNC: 29 MMOL/L (ref 21–32)
CREAT SERPL-MCNC: 1.05 MG/DL (ref 0.4–1)
GLUCOSE SERPL-MCNC: 75 MG/DL (ref 70–99)
HCT VFR BLD AUTO: 42.1 % (ref 33–51)
HGB BLD-MCNC: 13.5 G/DL (ref 11.5–16)
MAGNESIUM SERPL-MCNC: 1.7 MG/DL (ref 1.6–2.4)
PHOSPHATE SERPL-MCNC: 3 MG/DL (ref 2.5–4.9)
POTASSIUM SERPL-SCNC: 4 MMOL/L (ref 3.5–5.5)
SODIUM SERPL-SCNC: 134 MMOL/L (ref 136–145)

## 2020-08-10 NOTE — NUR
SHIFT SUMMARY
 
LYING IN SEMI FOWLERS WITH EYES OPEN WHILE WATCHING TV.  AAO X3, HAS RESTED
WELL.  RESPIRATIONS EVEN AND UNLABORED ON ROOM AIR.  LUNG SOUNDS CLEAR AND
DIMINSHED IN BASES BILATERALLY.  ABDOMEN SOFT AND NONDISTENDED.  BOWEL SOUNDS
PRESENT IN ALL QUADS.  LEFT UPPER ARM POWERGLIDE IS PATENT, FLUSHING WITH EASE
AND GOOD BLOOD RETURN NOTED.  AM LABS DRAWN FROM POWERGLIDE AND SENT TO LAB.
BLE PLACED ON PILLOWS WITH DRESSING C/D/I, +3 EDEMA NOTED.  CONTINENT OF BOWEL
AND BLADDER, HAS URINATED X3 AND HAD 2 BM'S THIS SHIFT.  DENIES PAIN,
DISCOMFORT, OR FURTHER NEEDS AT THIS TIME.  SAFETY MEASURES IN PLACE.  WILL
CONTINUE TO MONITOR AND GIVE HAND OFF TO ONCOMING SHIFT USING SBAR DURING
BEDSIDE REPORT.

## 2020-08-10 NOTE — NUR
Received a call from KAIT Mahmood RN.  She is requesting clarification on
whether pt is going home with HH or hospice.  Chart reviewed.  Palliative care
note on 8/2/20 indicates pt is interested in hospice services at time of
discharge.  On 8/4/20 hospitalist progress note states pt had changed her mind
and now wants to pursue going home with home health services.
 
Met with Christel in her room this morning.  She is sitting up in the bedside
chair and is welcoming of a visit.  She states that Dr. Villasenor said she could
go home either today or tomorrow.  She reports Dr. Welch told her that she
could discharge today.  She is hoping to be able to stay one more night in the
hospital however she reports that if she needs to go home today that she will.
She lives in a home with two roommates (Maribel and Jose Guadalupe).  She reports that
she normally lives in the downstairs part of the home, however recently she
moved to a bedroom upstairs as navigating stairs is impossible for her to do
right now.
 
She confirms her desire for HH and wants to get stronger.  She states she is
getting some equipment from [facility] (walker, shower bench and BSC) to have at
home.  She states she uses a transportation company to get to and from
appointments.  She is unable to recall the name at this time.
 
She states someone was coming into her home to change her dressings prior to
coming into the hospital.  Updated KAIT Mahmood, after my visit that pt is
confirming her desire for going home with HH.  Spoke with Loraine, who states
that she is waiting to hear from Dr. Welch if the discharge will have today
or tomorrow.  PC will remain available.

## 2020-08-10 NOTE — NUR
SHIFT SUMMARY
PT HAS DONE WELL TODAY. HAS MADE ARRANGEMENTS FOR HOME TOMORROW. HAS
CAREGIVERS SET UP. L LEG CONTINUES TO WEEP. UP W/ SBA IN ROOM W/ FWW.

## 2020-08-11 LAB
ALBUMIN SERPL BCP-MCNC: 2 G/DL (ref 3.4–5)
ANION GAP SERPL CALCULATED.4IONS-SCNC: 7 MMOL/L (ref 6–16)
BUN SERPL-MCNC: 15 MG/DL (ref 8–24)
CALCIUM SERPL-MCNC: 8.3 MG/DL (ref 8.5–10.1)
CHLORIDE SERPL-SCNC: 96 MMOL/L (ref 98–108)
CO2 SERPL-SCNC: 30 MMOL/L (ref 21–32)
CREAT SERPL-MCNC: 1.04 MG/DL (ref 0.4–1)
GLUCOSE SERPL-MCNC: 71 MG/DL (ref 70–99)
HCT VFR BLD AUTO: 40.5 % (ref 33–51)
HGB BLD-MCNC: 13 G/DL (ref 11.5–16)
MAGNESIUM SERPL-MCNC: 1.6 MG/DL (ref 1.6–2.4)
PHOSPHATE SERPL-MCNC: 3.1 MG/DL (ref 2.5–4.9)
POTASSIUM SERPL-SCNC: 4.1 MMOL/L (ref 3.5–5.5)
SODIUM SERPL-SCNC: 133 MMOL/L (ref 136–145)

## 2020-08-11 NOTE — NUR
DR LUAN HERE RECENTLY TO SEE PT, DISCUSSED DISCHARGE INCLUDING THAT CASE
MANAGER HAS BEEN TO TALK WITH PT.

## 2020-08-11 NOTE — NUR
SHIFT SUMMARY
PT A/OX4. DENIES SOB OR DYSPNEA, SPO2 ABOVE 93% ON RA. RESPIRATIONS APPEAR
EVEN AND UNLABORED. DRESSING TO LLE EXTREM CHANGED THIS SHIFT R/T
YELLOW/SEROUS DRAINAGE. DRESSING TO RLE C.D.I. 3+ EDEMA TO BLE, BOTH
ELEVATED ON PILLOWS T/O SHIFT. PT AMBULATING TO BATHROOM, HAS VOIDED SEVERAL
TIMES. DESIRAE PO INTAKE. ABX INFUSED PER ORDERS. DENIES PAIN OR DISCOMFORT. IS
EAGERLY AWAITING POSSIBLE DISCHARGE HOME TODAY. AWAITING MORNING LABS. PT
APPEARS TO BE CURRENTLY RESTING IN BED WITH EYES CLOSED, HAS CALL LIGHT IN
REACH.

## 2020-08-11 NOTE — NUR
DISCHARGE:
PT EATING AND DRINKING, VOIDING. PT REPORTS HAVING CAREGIVERS MEETING HER AT
HER HOUSE WITH APPR EQUIP. PT REPORTS UNDERSTANDING OF DISCHARGE INSTRUCTIONS
AND AWARE OF H.H.. PT REPORTS HAVING A FRIEND THAT IS A CAREGIVER THAT WILL
HELP HER AS NEEDED AS WELL. PT POWERGLIDE OUT WNL, NO OTHER IV'S IN PLACE.
ALL PT'S DRESSINGS WERE PHOTOGRAPHED WITH NEW DRESSINGS PLACED PRIOR TO PT
LEAVING. PT OUT BY TRANSPORT WITH BELONGINGS AND DRESSING SUPPLIES. CASE
MANAGER AND OTHER STAFF ASSISTED WITH DISCHARGE INCLUDING H.H., SUPPLIES,
CAREGIVERS. PT REPORTS THAT SHE HAS TALKED WITH FAMILY THAT SHE IS GOING HOME
TODAY. MEDICATIONS FAXED TO PHARMACY OF HER CHOICE BY CHARGE RN WHO ALSO
ASSISTED WITH DISCHARGE PAPERWORK. PT OUT BY TRANSPORT.

## 2020-08-11 NOTE — NUR
PT REPORTS TALKING TO [facility] WHO REPORTED THAT
"EVERYTHING IS TAKEN CARE OF". PT REPORTS THAT SHE WILL HAVE CAREGIVERS COMING
TODAY TO ASSIST HER PER MARITO. PT REPORTS THAT SHE WOULD BE NEEDING A RIDE FROM
[facility] AND THAT SHE HAS USED THEM SEVERAL TIMES.

## 2020-08-20 ENCOUNTER — HOSPITAL ENCOUNTER (INPATIENT)
Dept: HOSPITAL 95 - ER | Age: 70
LOS: 7 days | Discharge: NURSING, REHAB OR LONG-TERM CARE | DRG: 872 | End: 2020-08-27
Attending: HOSPITALIST | Admitting: HOSPITALIST
Payer: COMMERCIAL

## 2020-08-20 VITALS — HEIGHT: 64.02 IN | BODY MASS INDEX: 23.34 KG/M2 | WEIGHT: 136.69 LBS

## 2020-08-20 DIAGNOSIS — A41.89: Primary | ICD-10-CM

## 2020-08-20 DIAGNOSIS — F32.9: ICD-10-CM

## 2020-08-20 DIAGNOSIS — I48.0: ICD-10-CM

## 2020-08-20 DIAGNOSIS — M81.0: ICD-10-CM

## 2020-08-20 DIAGNOSIS — E87.2: ICD-10-CM

## 2020-08-20 DIAGNOSIS — I95.9: ICD-10-CM

## 2020-08-20 DIAGNOSIS — I50.22: ICD-10-CM

## 2020-08-20 DIAGNOSIS — Z79.83: ICD-10-CM

## 2020-08-20 DIAGNOSIS — Z87.891: ICD-10-CM

## 2020-08-20 DIAGNOSIS — I87.2: ICD-10-CM

## 2020-08-20 DIAGNOSIS — R19.7: ICD-10-CM

## 2020-08-20 DIAGNOSIS — Z20.828: ICD-10-CM

## 2020-08-20 DIAGNOSIS — R65.20: ICD-10-CM

## 2020-08-20 DIAGNOSIS — E87.1: ICD-10-CM

## 2020-08-20 DIAGNOSIS — I27.20: ICD-10-CM

## 2020-08-20 DIAGNOSIS — K21.9: ICD-10-CM

## 2020-08-20 DIAGNOSIS — L03.116: ICD-10-CM

## 2020-08-20 DIAGNOSIS — E86.0: ICD-10-CM

## 2020-08-20 DIAGNOSIS — Z66: ICD-10-CM

## 2020-08-20 DIAGNOSIS — N17.9: ICD-10-CM

## 2020-08-20 DIAGNOSIS — J44.9: ICD-10-CM

## 2020-08-20 DIAGNOSIS — E44.0: ICD-10-CM

## 2020-08-20 DIAGNOSIS — N18.3: ICD-10-CM

## 2020-08-20 DIAGNOSIS — I42.8: ICD-10-CM

## 2020-08-20 LAB
ALBUMIN SERPL BCP-MCNC: 2.5 G/DL (ref 3.4–5)
ALBUMIN/GLOB SERPL: 0.7 {RATIO} (ref 0.8–1.8)
ALT SERPL W P-5'-P-CCNC: 38 U/L (ref 12–78)
ANION GAP SERPL CALCULATED.4IONS-SCNC: 23 MMOL/L (ref 6–16)
AST SERPL W P-5'-P-CCNC: 74 U/L (ref 12–37)
BASOPHILS # BLD AUTO: 0.06 K/MM3 (ref 0–0.23)
BASOPHILS NFR BLD AUTO: 1 % (ref 0–2)
BILIRUB SERPL-MCNC: 1.7 MG/DL (ref 0.1–1)
BUN SERPL-MCNC: 47 MG/DL (ref 8–24)
CALCIUM SERPL-MCNC: 7.5 MG/DL (ref 8.5–10.1)
CHLORIDE SERPL-SCNC: 94 MMOL/L (ref 98–108)
CO2 SERPL-SCNC: 12 MMOL/L (ref 21–32)
CREAT SERPL-MCNC: 3.03 MG/DL (ref 0.4–1)
DEPRECATED RDW RBC AUTO: 60.5 FL (ref 35.1–46.3)
EOSINOPHIL # BLD AUTO: 0 K/MM3 (ref 0–0.68)
EOSINOPHIL NFR BLD AUTO: 0 % (ref 0–6)
ERYTHROCYTE [DISTWIDTH] IN BLOOD BY AUTOMATED COUNT: 17.1 % (ref 11.7–14.2)
GLOBULIN SER CALC-MCNC: 3.5 G/DL (ref 2.2–4)
GLUCOSE SERPL-MCNC: 101 MG/DL (ref 70–99)
HCT VFR BLD AUTO: 52.3 % (ref 33–51)
HGB BLD-MCNC: 16.3 G/DL (ref 11.5–16)
IMM GRANULOCYTES # BLD AUTO: 0.12 K/MM3 (ref 0–0.1)
IMM GRANULOCYTES NFR BLD AUTO: 1 % (ref 0–1)
KETONES UR STRIP-MCNC: (no result) MG/DL
LYMPHOCYTES # BLD AUTO: 1.8 K/MM3 (ref 0.84–5.2)
LYMPHOCYTES NFR BLD AUTO: 15 % (ref 21–46)
MCHC RBC AUTO-ENTMCNC: 31.2 G/DL (ref 31.5–36.5)
MCV RBC AUTO: 95 FL (ref 80–100)
MONOCYTES # BLD AUTO: 0.67 K/MM3 (ref 0.16–1.47)
MONOCYTES NFR BLD AUTO: 6 % (ref 4–13)
NEUTROPHILS # BLD AUTO: 9.49 K/MM3 (ref 1.96–9.15)
NEUTROPHILS NFR BLD AUTO: 78 % (ref 41–73)
NRBC # BLD AUTO: 0 K/MM3 (ref 0–0.02)
NRBC BLD AUTO-RTO: 0 /100 WBC (ref 0–0.2)
PLATELET # BLD AUTO: 355 K/MM3 (ref 150–400)
POTASSIUM SERPL-SCNC: 5 MMOL/L (ref 3.5–5.5)
PROT SERPL-MCNC: 6 G/DL (ref 6.4–8.2)
PROT UR STRIP-MCNC: (no result) MG/DL
PROTHROMBIN TIME: 14.9 SEC (ref 9.7–11.5)
RBC #/AREA URNS HPF: (no result) /HPF (ref 0–2)
SODIUM SERPL-SCNC: 129 MMOL/L (ref 136–145)
SP GR SPEC: 1.02 (ref 1–1.02)
TROPONIN I SERPL-MCNC: <0.015 NG/ML (ref 0–0.04)
UROBILINOGEN UR STRIP-MCNC: (no result) MG/DL

## 2020-08-20 PROCEDURE — A9270 NON-COVERED ITEM OR SERVICE: HCPCS

## 2020-08-20 PROCEDURE — C1751 CATH, INF, PER/CENT/MIDLINE: HCPCS

## 2020-08-20 PROCEDURE — U0002 COVID-19 LAB TEST NON-CDC: HCPCS

## 2020-08-20 NOTE — NUR
ADMIT ASSESSMENT
PT ARRIVED SHORTLY PRIOR TO THIS RN'S SHIFT STARTING. REPORT RECIEVED FROM OFF
GOING RN SARAH. DAY SHIFT RN PLACED A POWER GLIDE TO RIGHT UPPER ARM. PT
TOLERATED THIS WELL. THIS RN WAS ABLE TO COLLECT ADMIT HISTORY FROM PT. SEEMS
TO BE A FAIRLY ACCURATE HISTORIAN. PT IS SLOW TO RESPOND TO QUESTIONS AT TIMES
AND IRRITABLE WITH SOME OF HER CARE. SHE IS VERY PARTICULAR ABOUT HER LEGS AND
STATES SHE WANTS THEM TO REMAIN OPEN TO AIR T/O SHIFT. PICTURES WERE TAKEN OF
HER LEGS AND PLACED IN CHART. PT ALSO HAS A SMALL OPEN WOUND TO HER COCCYX AND
WANTS THIS TO ALSO REMAIN OPEN AT THIS TIME. WILL ATTEMPT TO PLACE DRESSINGS
LATER WHEN PT IS MORE COOPERATIVE. VITALS ARE STABLE SHE HAS A LOW BP. PT HAS
NS RUNNING  TILL THIS LITER OF NS FINISHES THEN FLUIDS WILL BE DECREASED
TO 150ML/HR FOR TWO LITERS. CALLED DR QUILES TO CONFIRM PT DNR STATUS AS WELL
AS PLACING A WITT CATH PER PT'S REQUEST. WITT TEMP PROBE WAS PLACED BY THIS
RN WITH NO ISSUES. PT TOLERATED WELL. PICTURES OF LEGS AND BUTTOCKS WERE TAKEN
FOR CHART. PT WAS TURNED AND A NEW ATTENDS PUT IN PLACE. SHE DOES HAVE
BLEEDING FROM EITHER HER VAGINA OR HER RECTUM. IT IS HARD TO TELL EXACTLY
WHERE IT IS COMING FROM AT THIS TIME. JUST A SMEAR OF BLOOD THAT IS BRIGHT
RED. CALL LIGHT IN REACH BED IN LOW POSITION. PT'S ROOM MATE CALLED AND STATES
SHE WILL BRING IN HER HOME MEDS AND OTHER NEEDED ITEMS. WILL CON'T TO MONITOR
AND KEEP PT SAFE.

## 2020-08-21 LAB
ALBUMIN SERPL BCP-MCNC: 1.8 G/DL (ref 3.4–5)
ALBUMIN/GLOB SERPL: 0.5 {RATIO} (ref 0.8–1.8)
ALT SERPL W P-5'-P-CCNC: 25 U/L (ref 12–78)
ANION GAP SERPL CALCULATED.4IONS-SCNC: 13 MMOL/L (ref 6–16)
AST SERPL W P-5'-P-CCNC: 42 U/L (ref 12–37)
BASOPHILS # BLD: 0 K/MM3 (ref 0–0.23)
BASOPHILS NFR BLD: 0 % (ref 0–2)
BILIRUB SERPL-MCNC: 1.2 MG/DL (ref 0.1–1)
BUN SERPL-MCNC: 46 MG/DL (ref 8–24)
CALCIUM SERPL-MCNC: 6.9 MG/DL (ref 8.5–10.1)
CHLORIDE SERPL-SCNC: 102 MMOL/L (ref 98–108)
CO2 SERPL-SCNC: 18 MMOL/L (ref 21–32)
CREAT SERPL-MCNC: 2.43 MG/DL (ref 0.4–1)
DEPRECATED RDW RBC AUTO: 58.9 FL (ref 35.1–46.3)
EOSINOPHIL # BLD: 0 K/MM3 (ref 0–0.68)
EOSINOPHIL NFR BLD: 0 % (ref 0–6)
ERYTHROCYTE [DISTWIDTH] IN BLOOD BY AUTOMATED COUNT: 17.2 % (ref 11.7–14.2)
GLOBULIN SER CALC-MCNC: 3.3 G/DL (ref 2.2–4)
GLUCOSE SERPL-MCNC: 104 MG/DL (ref 70–99)
HCT VFR BLD AUTO: 44.2 % (ref 33–51)
HGB BLD-MCNC: 14.6 G/DL (ref 11.5–16)
LYMPHOCYTES # BLD: 0.67 K/MM3 (ref 0.84–5.2)
LYMPHOCYTES NFR BLD: 7 % (ref 21–46)
MCHC RBC AUTO-ENTMCNC: 33 G/DL (ref 31.5–36.5)
MCV RBC AUTO: 93 FL (ref 80–100)
MONOCYTES # BLD: 0.95 K/MM3 (ref 0.16–1.47)
MONOCYTES NFR BLD: 10 % (ref 4–13)
NEUTS BAND NFR BLD MANUAL: 42 % (ref 0–8)
NEUTS SEG # BLD MANUAL: 7.95 K/MM3 (ref 1.96–9.15)
NEUTS SEG NFR BLD MANUAL: 41 % (ref 41–73)
NRBC # BLD AUTO: 0 K/MM3 (ref 0–0.02)
NRBC BLD AUTO-RTO: 0 /100 WBC (ref 0–0.2)
PLATELET # BLD AUTO: 364 K/MM3 (ref 150–400)
POTASSIUM SERPL-SCNC: 4.1 MMOL/L (ref 3.5–5.5)
PROT SERPL-MCNC: 5.1 G/DL (ref 6.4–8.2)
SODIUM SERPL-SCNC: 133 MMOL/L (ref 136–145)
TOTAL CELLS COUNTED BLD: 100

## 2020-08-21 PROCEDURE — 0D9P70Z DRAINAGE OF RECTUM WITH DRAINAGE DEVICE, VIA NATURAL OR ARTIFICIAL OPENING: ICD-10-PCS | Performed by: HOSPITALIST

## 2020-08-21 NOTE — NUR
CALLED DR. QUILES TO DISCUSS PLAN OF CARE. HE STATED HE WANTED TO GIVE PATIENT
HER METOPROLOL DESPITE PT'S BLOOD PRESSURE DUE TO PATIENT'S HEART RATE. IT IS
SOMEWHAT DIFFICULT TO TAKE PT'S BLOOD PRESSURE SINCE THIS IS POSITIONAL. MAP
NOTED ANYWHERE FROM 60s-77s EVEN IF SYSTOLIC BP IS IS IN THE 70s

## 2020-08-21 NOTE — NUR
0830-ASSUMED CARE OF PATIENT. REPORT RECEIVED FROM ELEAZAR HARDING. PT IS ALERT AND
ORIENTED. PT COMPLAINTS OF PAIN. PT WAS RECENTLY MEDICATED FOR PAIN.
0845-PT WAS ASSISTED TO THE BEDSIDE COMMODE.
1015-ASSISTED PATIENT BACK TO BED. PERICARE AND SKIN CARE DONE. PLACED A
RECTAL TUBE SINCE PT IS HAVING LIQUID WATERY STOOL. SKIN INTEGRITY TO
BUTTOCKS IS RED AND SMALL ULCER NOTED. PT'S BLOOD PRESSURE IS BETWEEN TO
70s-90s SYSTOLIC.
1027-YAMILEX ISAAC WITH PALLIATIVE CARE AT BEDSIDE TALKING TO PATIENT AT THIS
TIME.

## 2020-08-21 NOTE — NUR
PT SEEN BY DR. DELGADILLO AND DR. QUILES. PT WILL RECEIVED 500C BOLUS AND THEN
MAINTENANCE FLUID WILL BE INCREASED TO 200ML/HR.

## 2020-08-21 NOTE — NUR
CALLED DR. DELGADILLO FOR CLARIFICATION ORDERSE. PT HAD TO NEGATIVE RAPID COVID
TESTS WITHIN 2 WEEKS. THIS ORDER WAS CANCELLED AS PER DR. DELGADILLO.

## 2020-08-21 NOTE — NUR
SHIFT SUMMARY
PT HAS BEEN COOPERATIVE AND PLESANT T/O SHIFT. SHE HAS NO CHANGES FROM
BASELINE. HER STOOL IS LIQUID BUT THERE IS NO REDNESS TO STOOL. PT CON'T TO BE
INCONTINENT IN HER ATTENDS AND THEN CALLS TO USE THE BSC. PT IS ABLE TO STAND
UNDER HER OWN POWER WITH JUST STANDBY ASSIST. VITALS ARE STABLE. SHE DOES HAVE
A SOFT LOWER BP THIS SHIFT. HER HRR UNTIL SHE STANDS AND MOVES ABOUT. DENIES
CHEST PAIN T/O SHIFT. STATES AGAIN THIS AM HER ONLY PAIN IS TO HER LEGS. SHE
CON'T TO WANT NO DRESSINGS PLACED. THIS RN CON'T TO CHANGE DRY FLOWS UNDER HER
LEGS AND HER TOP SHEETS TO KEEP EVERYTHING CLEAN. PT HAS CALL LIGHT IN REACH
AND STATES SHE IS GOING TO REST MORE. WILL CON'T TO MONITOR AND KEEP PT SAFE
TILL REPORT TO ONCOMING RN.

## 2020-08-21 NOTE — NUR
Spiritual care visit conducted. Patient is sitting up in bed and alert.
Patient explains about her medical issues and the plan for care. Patient talks
about her struggle to understand why terrible things happen to good people,
She then shares at length about the tension in her home with her room mate and
the places she feels a lack care. I listen empathically, talk about areas
where forgiveness may be possible, normalize patient's experience, reinforce
helpful attitudes and practices and provide pastoral care and prayer. Patient
responds well and shows signs of catharsis and restored yg. I will continue
to help patient work through how her medical issues affect the spiritual  and
emotional aspects of her life and work with her on areas of forgiveness.

## 2020-08-21 NOTE — NUR
Spoke with ICU Charge RN Felicita prior to Pt visit and discussed case. Felicita
reports Pt may benefit from discussion regarding goals of care.
 
Joint Pt visit with Caremanager Juarez and this RN. Pt lethargic and
struggles with conversation. Pt continues to close her eyes and appeard to
drift of to sleep. During moments that Pt was willing to engage in
conversaiton she reported having issues at home with her room mate. Briefly
discussed option for hospice with Pt reporting no interest. Asked Pt if this
RN could contact room mate who is listed as NOK with Pt denying need at this
time. Pt requests for Ann and this RN to visit at a later time so she can
rest.
 
Palliative Care will remain available.

## 2020-08-22 LAB
ANION GAP SERPL CALCULATED.4IONS-SCNC: 10 MMOL/L (ref 6–16)
BASOPHILS # BLD: 0 K/MM3 (ref 0–0.23)
BASOPHILS NFR BLD: 0 % (ref 0–2)
BUN SERPL-MCNC: 34 MG/DL (ref 8–24)
CALCIUM SERPL-MCNC: 6.5 MG/DL (ref 8.5–10.1)
CHLORIDE SERPL-SCNC: 109 MMOL/L (ref 98–108)
CO2 SERPL-SCNC: 17 MMOL/L (ref 21–32)
CREAT SERPL-MCNC: 1.47 MG/DL (ref 0.4–1)
DEPRECATED RDW RBC AUTO: 58.4 FL (ref 35.1–46.3)
EOSINOPHIL # BLD: 0 K/MM3 (ref 0–0.68)
EOSINOPHIL NFR BLD: 0 % (ref 0–6)
ERYTHROCYTE [DISTWIDTH] IN BLOOD BY AUTOMATED COUNT: 17.2 % (ref 11.7–14.2)
GLUCOSE SERPL-MCNC: 92 MG/DL (ref 70–99)
HCT VFR BLD AUTO: 36.6 % (ref 33–51)
HGB BLD-MCNC: 12.1 G/DL (ref 11.5–16)
LYMPHOCYTES # BLD: 0.23 K/MM3 (ref 0.84–5.2)
LYMPHOCYTES NFR BLD: 3 % (ref 21–46)
MCHC RBC AUTO-ENTMCNC: 33.1 G/DL (ref 31.5–36.5)
MCV RBC AUTO: 92 FL (ref 80–100)
MONOCYTES # BLD: 0.23 K/MM3 (ref 0.16–1.47)
MONOCYTES NFR BLD: 3 % (ref 4–13)
NEUTS BAND NFR BLD MANUAL: 44 % (ref 0–8)
NEUTS SEG # BLD MANUAL: 7.44 K/MM3 (ref 1.96–9.15)
NEUTS SEG NFR BLD MANUAL: 50 % (ref 41–73)
NRBC # BLD AUTO: 0 K/MM3 (ref 0–0.02)
NRBC BLD AUTO-RTO: 0 /100 WBC (ref 0–0.2)
PLATELET # BLD AUTO: 281 K/MM3 (ref 150–400)
POTASSIUM SERPL-SCNC: 3.1 MMOL/L (ref 3.5–5.5)
SODIUM SERPL-SCNC: 136 MMOL/L (ref 136–145)
TOTAL CELLS COUNTED BLD: 100

## 2020-08-22 NOTE — NUR
END OF SHIFT SUMMARY
 
NO ACUTE CHANGES THIS SHIFT. PT AXO BUT GROGGY WITH WAKING UP. BP REMAINS
80'S-90 SYSTOLIC. PT ASYMPTOMATIC. PT REMAINS IN AFIB 110'S. REMAINS ON RA.
RECTAL TUBE IN PLACE, WITH DARK BROWN LIQUID BM. WITT PATENT AND DRAINING
YELLOW URINE. MEPILEX APPLIED TO SACRUM PROPHYLACTICALLY. WOUND DRESSINGS TO
BILAT LE'S CDI. POWERGLIDE PATENT AND DRAWS BLOOD. PT BEING REPOSITIONED BY
STAFF. WILL CONTINUE TO MONITOR UNTIL SHIFT CHANGE.

## 2020-08-22 NOTE — NUR
ASSUMED CARE:
REPORT RECEIVED FROM SHAWN SHOEMAKER RN. ASSUMED CARE OF THIS PT AT APPROX 0700.
ON ASSESSMENT, THE PT IS RESTING QUIETLY. SHE AWAKENS & IS SOFTLY CALLING OUT
FOR THE NURSE, BUT DOES NOT USE CALL LIGHT. SHE IS NOT IMPULSIVE & DOES NOT
ATTEMPT TO GET OOB. AT THAT TIME SHE IS SAYING NONSENSICAL PHRASES & STS
FEELING CONFUSED. SHE REDIRECTS WELL AS SHE WAKES UP MORE & IS NOW
ALERT/ORIENTED. WOUNDS TO BLE WERE REDRESSED BY NIGHT SHIFT BUT HAVE A SMALL
AMNT OF DRAINAGE NOTED TO DRESSINGS & WILL NEED TO BE CHANGED AGAIN THIS
SHIFT.
WILL CONTINUE TO MONITOR & UPDATE AS NEEDED.

## 2020-08-22 NOTE — NUR
DR QUILES:
PROVIDER AT BEDSIDE TO EVAL PT. STS SHE IS OKAY TO TRANSFER TO PCU STATUS. HE
IS NOT CONCERNED W/ PT's CURRENT HYPOTENSION AS SHE HAS BEEN PERFUSING WELL &
MANUAL BP READINGS ARE IMPROVED. STS TO GIVE AM DOSE OF METOPROLOL UNLESS SBP
< 80, PARAMETERS ADDED TO EMAR.

## 2020-08-22 NOTE — NUR
PCU DAYSHIFT SUMMARY
PATIENT ARRIVED TO PCU 16 FROM ICU 3 - PATIENT ALERT AND ORIENTED TO SELF AND
LOCATION, CONFUSED TO SITUATION AND TIME/DATE. PATIENT DENIES ANY PAIN T/O
SHIFT IN PCU. RESP E/U ON ROOM AIR. PATIENT REMAINS IN AFIB IN THE 'S.
BLOOD PRESSURES STABLE. PATIENTS BLE ELEVATED DUE TO BLE EDEMA - UNNA BOOTS
INPLACE. FRAGILE SKIN NOTED. NO ACUTE CHANGES NOTED T/O SHIFT. FEXISEAL IN
PLACE DRAINING LIQUID STOOL AND WITT CATH IN PLACE DRAINING DARK YELLOW
URINE. WILL CONTINUE TO MONITOR AND REPORT TO NOC SHIFT RN.

## 2020-08-22 NOTE — NUR
TRANSFER TO PCU:
REPORT GIVEN TO KJ WORTHINGTON RN TO ASSUME CARE. PT TX TO ROOM PCU-16 AT APPROX
1035 BY JAMIE KEE. BP CHECKED PRIOR TO TRANSFER REMAINS STABLE AFTER AM
METOPROLOL DOSE. CHART, MEDS & ALL BELONGINGS HAVE BEEN TRANSFERRED W/ PT.

## 2020-08-22 NOTE — NUR
POSSIBLE BLOOD IN STOOL
PT ASSISTED TO BEDPAN FOR FREQUENT DIARRHEA. PT APPEARS TO HAVE POSSIBLE BLOOD
IN STOOL. NOTIFIED PHYSICIAN. GUIAC SAMPLE ORDERED. SPECIMEN OBTAINED AND SENT
TO LAB.

## 2020-08-23 LAB
ANION GAP SERPL CALCULATED.4IONS-SCNC: 10 MMOL/L (ref 6–16)
BASOPHILS # BLD AUTO: 0.07 K/MM3 (ref 0–0.23)
BASOPHILS # BLD: 0 K/MM3 (ref 0–0.23)
BASOPHILS NFR BLD AUTO: 1 % (ref 0–2)
BASOPHILS NFR BLD: 0 % (ref 0–2)
BUN SERPL-MCNC: 21 MG/DL (ref 8–24)
CALCIUM SERPL-MCNC: 7 MG/DL (ref 8.5–10.1)
CHLORIDE SERPL-SCNC: 113 MMOL/L (ref 98–108)
CO2 SERPL-SCNC: 16 MMOL/L (ref 21–32)
CREAT SERPL-MCNC: 0.95 MG/DL (ref 0.4–1)
DEPRECATED RDW RBC AUTO: 61.9 FL (ref 35.1–46.3)
EOSINOPHIL # BLD AUTO: 0.14 K/MM3 (ref 0–0.68)
EOSINOPHIL # BLD: 0.43 K/MM3 (ref 0–0.68)
EOSINOPHIL NFR BLD AUTO: 1 % (ref 0–6)
EOSINOPHIL NFR BLD: 4 % (ref 0–6)
ERYTHROCYTE [DISTWIDTH] IN BLOOD BY AUTOMATED COUNT: 18 % (ref 11.7–14.2)
GLUCOSE SERPL-MCNC: 65 MG/DL (ref 70–99)
HCT VFR BLD AUTO: 39.2 % (ref 33–51)
HGB BLD-MCNC: 12.5 G/DL (ref 11.5–16)
IMM GRANULOCYTES # BLD AUTO: 0.05 K/MM3 (ref 0–0.1)
IMM GRANULOCYTES NFR BLD AUTO: 1 % (ref 0–1)
LYMPHOCYTES # BLD AUTO: 1.34 K/MM3 (ref 0.84–5.2)
LYMPHOCYTES # BLD: 1.31 K/MM3 (ref 0.84–5.2)
LYMPHOCYTES NFR BLD AUTO: 12 % (ref 21–46)
LYMPHOCYTES NFR BLD: 12 % (ref 21–46)
MCHC RBC AUTO-ENTMCNC: 31.9 G/DL (ref 31.5–36.5)
MCV RBC AUTO: 94 FL (ref 80–100)
MONOCYTES # BLD AUTO: 0.43 K/MM3 (ref 0.16–1.47)
MONOCYTES # BLD: 0.21 K/MM3 (ref 0.16–1.47)
MONOCYTES NFR BLD AUTO: 4 % (ref 4–13)
MONOCYTES NFR BLD: 2 % (ref 4–13)
NEUTROPHILS # BLD AUTO: 8.92 K/MM3 (ref 1.96–9.15)
NEUTROPHILS NFR BLD AUTO: 82 % (ref 41–73)
NEUTS BAND NFR BLD MANUAL: 22 % (ref 0–8)
NEUTS SEG # BLD MANUAL: 8.97 K/MM3 (ref 1.96–9.15)
NEUTS SEG NFR BLD MANUAL: 60 % (ref 41–73)
NRBC # BLD AUTO: 0 K/MM3 (ref 0–0.02)
NRBC BLD AUTO-RTO: 0 /100 WBC (ref 0–0.2)
PLATELET # BLD AUTO: 310 K/MM3 (ref 150–400)
POTASSIUM SERPL-SCNC: 3.6 MMOL/L (ref 3.5–5.5)
SODIUM SERPL-SCNC: 139 MMOL/L (ref 136–145)
TOTAL CELLS COUNTED BLD: 100

## 2020-08-23 NOTE — NUR
SHIFT SUMMARY
PT ALERT AND ORIENTED. PT WEAK, UNABLE TO AMBULATE TO COMMODE. PT USED BED PAN
AS NEEDED. PT ABLE TO REPOSITION SELF IN BED AS NEEDED. DRESSINGS CHANGED ON
BLE AS NEEDED D/T WOUND DRAINAGE. MEPLEX ON COCCYX D/T SACRAL WOUND. PT
REPORTS NO CHEST PAIN OR PRESSURE. PT HYPOTENSIVE WITH SYSTOLIC BP IN THE
90'S. HR IN THE 'S. WILL CONTINUE TO MONITOR UNTIL REPORT GIVEN TO
DAYSHIFT RN.

## 2020-08-24 LAB
ANION GAP SERPL CALCULATED.4IONS-SCNC: 9 MMOL/L (ref 6–16)
BASOPHILS # BLD: 0 K/MM3 (ref 0–0.23)
BASOPHILS NFR BLD: 0 % (ref 0–2)
BUN SERPL-MCNC: 17 MG/DL (ref 8–24)
CALCIUM SERPL-MCNC: 7.1 MG/DL (ref 8.5–10.1)
CHLORIDE SERPL-SCNC: 113 MMOL/L (ref 98–108)
CO2 SERPL-SCNC: 16 MMOL/L (ref 21–32)
CREAT SERPL-MCNC: 0.86 MG/DL (ref 0.4–1)
DEPRECATED RDW RBC AUTO: 62.4 FL (ref 35.1–46.3)
EOSINOPHIL # BLD: 0.21 K/MM3 (ref 0–0.68)
EOSINOPHIL NFR BLD: 2 % (ref 0–6)
ERYTHROCYTE [DISTWIDTH] IN BLOOD BY AUTOMATED COUNT: 18.1 % (ref 11.7–14.2)
GLUCOSE SERPL-MCNC: 69 MG/DL (ref 70–99)
HCT VFR BLD AUTO: 39.4 % (ref 33–51)
HGB BLD-MCNC: 12.6 G/DL (ref 11.5–16)
LYMPHOCYTES # BLD: 1.37 K/MM3 (ref 0.84–5.2)
LYMPHOCYTES NFR BLD: 13 % (ref 21–46)
MCHC RBC AUTO-ENTMCNC: 32 G/DL (ref 31.5–36.5)
MCV RBC AUTO: 95 FL (ref 80–100)
MONOCYTES # BLD: 0.52 K/MM3 (ref 0.16–1.47)
MONOCYTES NFR BLD: 5 % (ref 4–13)
NEUTS BAND NFR BLD MANUAL: 14 % (ref 0–8)
NEUTS SEG # BLD MANUAL: 8.45 K/MM3 (ref 1.96–9.15)
NEUTS SEG NFR BLD MANUAL: 66 % (ref 41–73)
NRBC # BLD AUTO: 0 K/MM3 (ref 0–0.02)
NRBC BLD AUTO-RTO: 0 /100 WBC (ref 0–0.2)
PLATELET # BLD AUTO: 279 K/MM3 (ref 150–400)
POTASSIUM SERPL-SCNC: 3.8 MMOL/L (ref 3.5–5.5)
SODIUM SERPL-SCNC: 138 MMOL/L (ref 136–145)
TOTAL CELLS COUNTED BLD: 100

## 2020-08-24 NOTE — NUR
SHIFT SUMMARY
PT ALERT AND ORIENTED. VS STABLE. PT REPORTS NO CHEST PAIN OR PRESSURE. PT
TURNED Q 2 HOURS OR AS NEEDED FOR PAIN RELIEF. WOUNDS OPEN TO AIR. MEPLEX ON
COCCYX D/T REDNESS. MEPLEX ON ELBOW D/T RED/SORE AREA. HEELS FLOATED. WILL
CONTINUE TO MONITOR UNTIL REPORT GIVEN TO DAYSHIFT RN.

## 2020-08-24 NOTE — NUR
Spiritual care visit conducted. Patient talks about her appreciation for the
doctors who have been working to help her find the right antibiotic and
helping her manage the pain. Patient tells me about the new tensions that are
mounting where she resides and about some aspects that are improving. I
provide therapeutic listening and prayer. Patient responds well and shows
signs of an elevated mood. I will continue to work with patient on forgiveness
and setting safe boundaries.

## 2020-08-25 LAB
ANION GAP SERPL CALCULATED.4IONS-SCNC: 8 MMOL/L (ref 6–16)
BASOPHILS # BLD AUTO: 0.09 K/MM3 (ref 0–0.23)
BASOPHILS NFR BLD AUTO: 1 % (ref 0–2)
BUN SERPL-MCNC: 15 MG/DL (ref 8–24)
CALCIUM SERPL-MCNC: 7.5 MG/DL (ref 8.5–10.1)
CHLORIDE SERPL-SCNC: 114 MMOL/L (ref 98–108)
CO2 SERPL-SCNC: 15 MMOL/L (ref 21–32)
CREAT SERPL-MCNC: 0.77 MG/DL (ref 0.4–1)
DEPRECATED RDW RBC AUTO: 62.3 FL (ref 35.1–46.3)
EOSINOPHIL # BLD AUTO: 0.25 K/MM3 (ref 0–0.68)
EOSINOPHIL NFR BLD AUTO: 3 % (ref 0–6)
ERYTHROCYTE [DISTWIDTH] IN BLOOD BY AUTOMATED COUNT: 18.1 % (ref 11.7–14.2)
GLUCOSE SERPL-MCNC: 59 MG/DL (ref 70–99)
HCT VFR BLD AUTO: 41.6 % (ref 33–51)
HGB BLD-MCNC: 13.3 G/DL (ref 11.5–16)
IMM GRANULOCYTES # BLD AUTO: 0.16 K/MM3 (ref 0–0.1)
IMM GRANULOCYTES NFR BLD AUTO: 2 % (ref 0–1)
LYMPHOCYTES # BLD AUTO: 1.73 K/MM3 (ref 0.84–5.2)
LYMPHOCYTES NFR BLD AUTO: 17 % (ref 21–46)
MCHC RBC AUTO-ENTMCNC: 32 G/DL (ref 31.5–36.5)
MCV RBC AUTO: 95 FL (ref 80–100)
MONOCYTES # BLD AUTO: 0.9 K/MM3 (ref 0.16–1.47)
MONOCYTES NFR BLD AUTO: 9 % (ref 4–13)
NEUTROPHILS # BLD AUTO: 7.02 K/MM3 (ref 1.96–9.15)
NEUTROPHILS NFR BLD AUTO: 69 % (ref 41–73)
NRBC # BLD AUTO: 0 K/MM3 (ref 0–0.02)
NRBC BLD AUTO-RTO: 0 /100 WBC (ref 0–0.2)
PLATELET # BLD AUTO: 235 K/MM3 (ref 150–400)
POTASSIUM SERPL-SCNC: 4 MMOL/L (ref 3.5–5.5)
SODIUM SERPL-SCNC: 137 MMOL/L (ref 136–145)

## 2020-08-25 NOTE — NUR
ALERT AND ORIENTED. OPEN SORES OF BILAT LOWER LEGS OPEN TO AIR. PT REFUSED
DRESSING CHANGES. CURRENTLY RESTING QUIETLY. CALL LIGHT IN REACH

## 2020-08-25 NOTE — NUR
PT ASSESSMENT IS LIMITED AS PT SHOUTING AT STAFF TO LEAVE "GO AWAY, DON'T
TOUCH ME" REFUSING TO TAKE MEDICATIONS INITIALLY BUT DID END UP TAKING HER
MEDICATIONS. PT DOES DEMAND THAT STAFF LEAVE AND NOT TOUCH HER

## 2020-08-25 NOTE — NUR
SHIFT SUMMARY
 
PT IS A PCU TRANSFER THIS AFTERNOON. MEDICATED FOR PAIN X1 WITH TRAMADOL. PT
HAS A GOOD APPETITE. WITT PATENT AND DRAINING. DR. CALLEJAS IN TO SEE PT THIS
EVENING. NO NEED FOR SURGERY AT THIS TIME PER DR. CALLEJAS. THIS RN TALKED WITH
PT ABOUT GOING TO SNF AND NEEDING COVID TEST BUT PT DECLINED AND STATES SHE IS
GOING HOME. THIS RN ASKED IF PT WOULD HAVE ASSISTANCE AT HOME AND SHE REPORTS
SHE WILL. NO ACUTE CHANGES THIS SHIFT. REPORT GIVEN TO NOC RN. CALL LIGHT IN
REACH..

## 2020-08-25 NOTE — NUR
SHIFT SUMMARY
PT SLEPT T/O SHIFT. PT REPORTS INCREASE IN PAIN IN BLE. REPOSITIONING AND
MEDICATIONS GIVEN PER EMAR. PT REPORTS NO CHEST PAIN OR PRESSURE T/O SHIFT. VS
STABLE. WILL CONTINUE TO MONITOR UNTIL REPORT GIVEN TO DAYSHIFT RN.

## 2020-08-26 LAB
ANION GAP SERPL CALCULATED.4IONS-SCNC: 6 MMOL/L (ref 6–16)
BASOPHILS # BLD AUTO: 0.06 K/MM3 (ref 0–0.23)
BASOPHILS NFR BLD AUTO: 1 % (ref 0–2)
BUN SERPL-MCNC: 14 MG/DL (ref 8–24)
CALCIUM SERPL-MCNC: 7.6 MG/DL (ref 8.5–10.1)
CHLORIDE SERPL-SCNC: 112 MMOL/L (ref 98–108)
CO2 SERPL-SCNC: 18 MMOL/L (ref 21–32)
CREAT SERPL-MCNC: 0.78 MG/DL (ref 0.4–1)
DEPRECATED RDW RBC AUTO: 62.3 FL (ref 35.1–46.3)
EOSINOPHIL # BLD AUTO: 0.21 K/MM3 (ref 0–0.68)
EOSINOPHIL NFR BLD AUTO: 2 % (ref 0–6)
ERYTHROCYTE [DISTWIDTH] IN BLOOD BY AUTOMATED COUNT: 18.2 % (ref 11.7–14.2)
GLUCOSE SERPL-MCNC: 76 MG/DL (ref 70–99)
HCT VFR BLD AUTO: 40.4 % (ref 33–51)
HGB BLD-MCNC: 13.2 G/DL (ref 11.5–16)
IMM GRANULOCYTES # BLD AUTO: 0.13 K/MM3 (ref 0–0.1)
IMM GRANULOCYTES NFR BLD AUTO: 1 % (ref 0–1)
LYMPHOCYTES # BLD AUTO: 1.87 K/MM3 (ref 0.84–5.2)
LYMPHOCYTES NFR BLD AUTO: 20 % (ref 21–46)
MCHC RBC AUTO-ENTMCNC: 32.7 G/DL (ref 31.5–36.5)
MCV RBC AUTO: 93 FL (ref 80–100)
MONOCYTES # BLD AUTO: 1.03 K/MM3 (ref 0.16–1.47)
MONOCYTES NFR BLD AUTO: 11 % (ref 4–13)
NEUTROPHILS # BLD AUTO: 6.18 K/MM3 (ref 1.96–9.15)
NEUTROPHILS NFR BLD AUTO: 65 % (ref 41–73)
NRBC # BLD AUTO: 0 K/MM3 (ref 0–0.02)
NRBC BLD AUTO-RTO: 0 /100 WBC (ref 0–0.2)
PLATELET # BLD AUTO: 234 K/MM3 (ref 150–400)
POTASSIUM SERPL-SCNC: 4.5 MMOL/L (ref 3.5–5.5)
SODIUM SERPL-SCNC: 136 MMOL/L (ref 136–145)

## 2020-08-26 NOTE — NUR
Spiritual care visit conducted. Patient immediately tells me about the events
of the day. She talks about a 2 hour DC plan and then much business about
rethinking a SNF and then having her hospital stay increased at least through
the night. Patient tells me of the panic and fear of being discharged home and
the struggle to get care at home before these medical issues. I listen
empathically, provide anxiety containment and prayer. Patient responds well
and states that she is more at peace. I will continue to remain available to
patient and family.

## 2020-08-26 NOTE — NUR
SHIFT SUMMARY
 
AWAKE AT INTERVALS. ASSISTED WITH BEDPAN, LINEN CHANGED ONCE. BILAT LOWER
EXTREMITIES SORES CONTINUE TO SEEP SEROUS DRAINAGE. WITT DRAINING. HOB
ELEVATED FOR COMFORT. CALL LIGHT IN REACH.

## 2020-08-26 NOTE — NUR
SHIFT SUMMARY:
NO ACUTE CHANGES TO REPORT THIS SHIFT. PT A&O; CALM AND COOEPRATIVE WITH CARE.
MEDICATED FOR CHRONIC BACK PAIN PER EMAR. WOUNDS TO BLE; NO SURGICAL
INTERVENTION NEEDED (DR CALLEJAS). WITT ION PLACE; PATENT & DRAINING. PO ABX
CONTINUING. AWAITING PLACEMENT.

## 2020-08-27 LAB
ANION GAP SERPL CALCULATED.4IONS-SCNC: 11 MMOL/L (ref 6–16)
BASOPHILS # BLD AUTO: 0.08 K/MM3 (ref 0–0.23)
BASOPHILS NFR BLD AUTO: 1 % (ref 0–2)
BUN SERPL-MCNC: 13 MG/DL (ref 8–24)
CALCIUM SERPL-MCNC: 7.7 MG/DL (ref 8.5–10.1)
CHLORIDE SERPL-SCNC: 110 MMOL/L (ref 98–108)
CO2 SERPL-SCNC: 14 MMOL/L (ref 21–32)
CREAT SERPL-MCNC: 0.84 MG/DL (ref 0.4–1)
DEPRECATED RDW RBC AUTO: 62.9 FL (ref 35.1–46.3)
EOSINOPHIL # BLD AUTO: 0.32 K/MM3 (ref 0–0.68)
EOSINOPHIL NFR BLD AUTO: 3 % (ref 0–6)
ERYTHROCYTE [DISTWIDTH] IN BLOOD BY AUTOMATED COUNT: 18.5 % (ref 11.7–14.2)
GLUCOSE SERPL-MCNC: 70 MG/DL (ref 70–99)
HCT VFR BLD AUTO: 44.3 % (ref 33–51)
HGB BLD-MCNC: 14.3 G/DL (ref 11.5–16)
IMM GRANULOCYTES # BLD AUTO: 0.12 K/MM3 (ref 0–0.1)
IMM GRANULOCYTES NFR BLD AUTO: 1 % (ref 0–1)
LYMPHOCYTES # BLD AUTO: 2.22 K/MM3 (ref 0.84–5.2)
LYMPHOCYTES NFR BLD AUTO: 23 % (ref 21–46)
MCHC RBC AUTO-ENTMCNC: 32.3 G/DL (ref 31.5–36.5)
MCV RBC AUTO: 94 FL (ref 80–100)
MONOCYTES # BLD AUTO: 0.97 K/MM3 (ref 0.16–1.47)
MONOCYTES NFR BLD AUTO: 10 % (ref 4–13)
NEUTROPHILS # BLD AUTO: 6.09 K/MM3 (ref 1.96–9.15)
NEUTROPHILS NFR BLD AUTO: 62 % (ref 41–73)
NRBC # BLD AUTO: 0 K/MM3 (ref 0–0.02)
NRBC BLD AUTO-RTO: 0 /100 WBC (ref 0–0.2)
PLATELET # BLD AUTO: 233 K/MM3 (ref 150–400)
POTASSIUM SERPL-SCNC: 4.2 MMOL/L (ref 3.5–5.5)
SODIUM SERPL-SCNC: 135 MMOL/L (ref 136–145)

## 2020-08-27 NOTE — NUR
PT DISCHARGED
PT DISCHARGED AT 1545. BLE WOUNDS CLEANED. MEPILEX ON BUTTOCKS CHANGED. PT
MEDICATED FOR PAIN PRIOR TO DC. REPORT CALLED TO [facility]. PT SENT WITH [facility].
TRANSPORTED VIA WHEELCHAIR BY [facility].

## 2020-08-27 NOTE — NUR
SUMMARY
NO NEW ISSUES NOTED. PT HAS RESTED COMFORTABLY T/O SHIFT. PT HAS BEEN PLESANT
AND COOPERATIVE W/ CARE. PT LE'S HAVE BEEN KEPT CLEAN AND DRY. PT WITT
DRAINING WELL. PT CURRENTLY SLEEPING AND IN NO DISTRESS. CALL LIGHT IN REACH.

## 2020-09-23 ENCOUNTER — HOSPITAL ENCOUNTER (OUTPATIENT)
Dept: HOSPITAL 95 - ORSCMMR | Age: 70
Discharge: HOME | End: 2020-09-23
Attending: SURGERY
Payer: COMMERCIAL

## 2020-09-23 VITALS — HEIGHT: 64.02 IN | BODY MASS INDEX: 20.96 KG/M2 | WEIGHT: 122.8 LBS

## 2020-09-23 DIAGNOSIS — L97.905: Primary | ICD-10-CM

## 2020-09-23 DIAGNOSIS — Z53.9: ICD-10-CM

## 2020-09-23 NOTE — NUR
Ambulatory in Day Surgery
History, Chart, Medications and Allergies reviewed before start of
procedure. Lungs clear T/O to Auscultation. Lungs clear T/O to Auscultation.
Patient confirms NPO status and agrees with scheduled surgery.
Pre-Op teaching done. Pt verbalizes understanding.
Patient States Post-Procedure ride home has been arranged.

## 2020-09-23 NOTE — NUR
PRIOR TO DI/C PT SCHEDULED BY ORD.CATHY FOR AN APPOINTMENT WITH DR. LOMAX FOR
9/25/20 AT 0945. PT GIVEN APPOITMENT INFORMATION AND STATES UNDERSTANDING. PT
SUCCUSSFULLY D/C'D.

## 2020-09-25 ENCOUNTER — HOSPITAL ENCOUNTER (OUTPATIENT)
Dept: HOSPITAL 95 - WOUND | Age: 70
Discharge: HOME | End: 2020-09-25
Attending: SURGERY
Payer: COMMERCIAL

## 2020-09-25 DIAGNOSIS — L03.116: ICD-10-CM

## 2020-09-25 DIAGNOSIS — L03.115: ICD-10-CM

## 2020-09-25 DIAGNOSIS — L97.822: ICD-10-CM

## 2020-09-25 DIAGNOSIS — L97.812: Primary | ICD-10-CM

## 2020-09-25 DIAGNOSIS — I50.9: ICD-10-CM

## 2020-09-25 DIAGNOSIS — I87.2: ICD-10-CM

## 2020-09-25 DIAGNOSIS — Z79.899: ICD-10-CM

## 2020-09-28 ENCOUNTER — HOSPITAL ENCOUNTER (OUTPATIENT)
Dept: HOSPITAL 95 - MHTC | Age: 70
Discharge: HOME | End: 2020-09-28
Attending: INTERNAL MEDICINE
Payer: COMMERCIAL

## 2020-09-28 VITALS — BODY MASS INDEX: 20.53 KG/M2 | HEIGHT: 64.02 IN | WEIGHT: 120.24 LBS

## 2020-09-28 DIAGNOSIS — I48.0: Primary | ICD-10-CM

## 2020-09-28 DIAGNOSIS — J44.9: ICD-10-CM

## 2020-09-28 DIAGNOSIS — F32.9: ICD-10-CM

## 2020-09-28 DIAGNOSIS — I50.20: ICD-10-CM

## 2020-09-28 DIAGNOSIS — Z79.899: ICD-10-CM

## 2020-09-28 DIAGNOSIS — Z88.5: ICD-10-CM

## 2020-09-28 DIAGNOSIS — Z88.8: ICD-10-CM

## 2020-09-28 DIAGNOSIS — I48.3: ICD-10-CM

## 2020-09-28 DIAGNOSIS — Z87.891: ICD-10-CM

## 2020-09-28 DIAGNOSIS — K21.9: ICD-10-CM

## 2020-09-28 DIAGNOSIS — I42.8: ICD-10-CM

## 2020-09-28 DIAGNOSIS — I25.10: ICD-10-CM

## 2020-09-28 DIAGNOSIS — Z79.01: ICD-10-CM

## 2020-09-28 NOTE — NUR
ASSUMED CARE OF PATIENT. EKG DONE METOPROLOL 25 MG PO GIVEN BY NADER BUSH
PT ALERT AND ORIENTED EGM SHOWS SINUS TACHYCARDIA WITH PAROXYSMAL ATRIAL
TACHYCARDIA RUNS THEN SINUS RHYTHM WITH PAC'S.

## 2020-09-28 NOTE — NUR
DISCHARGE INSTRUCTIONS GIVEN WITH VERBAL AND WRITTEN UNDERSTANDING. ENFORECE
TO TAKE METOPROLOL DAILY STARTING TOMORROW.

## 2020-10-05 ENCOUNTER — HOSPITAL ENCOUNTER (OUTPATIENT)
Dept: HOSPITAL 95 - WOUND | Age: 70
Discharge: HOME | End: 2020-10-05
Attending: SURGERY
Payer: COMMERCIAL

## 2020-10-05 DIAGNOSIS — I50.9: ICD-10-CM

## 2020-10-05 DIAGNOSIS — Z79.899: ICD-10-CM

## 2020-10-05 DIAGNOSIS — L03.116: ICD-10-CM

## 2020-10-05 DIAGNOSIS — L03.115: ICD-10-CM

## 2020-10-05 DIAGNOSIS — I87.2: ICD-10-CM

## 2020-10-05 DIAGNOSIS — L97.812: Primary | ICD-10-CM

## 2020-10-05 DIAGNOSIS — L97.822: ICD-10-CM

## 2020-10-12 ENCOUNTER — HOSPITAL ENCOUNTER (OUTPATIENT)
Dept: HOSPITAL 95 - WOUND | Age: 70
Discharge: HOME | End: 2020-10-12
Attending: SURGERY
Payer: COMMERCIAL

## 2020-10-12 DIAGNOSIS — L03.115: ICD-10-CM

## 2020-10-12 DIAGNOSIS — L03.116: ICD-10-CM

## 2020-10-12 DIAGNOSIS — I87.2: ICD-10-CM

## 2020-10-12 DIAGNOSIS — Z79.899: ICD-10-CM

## 2020-10-12 DIAGNOSIS — L97.822: ICD-10-CM

## 2020-10-12 DIAGNOSIS — I50.9: ICD-10-CM

## 2020-10-12 DIAGNOSIS — L97.812: Primary | ICD-10-CM

## 2020-10-21 ENCOUNTER — HOSPITAL ENCOUNTER (OUTPATIENT)
Dept: HOSPITAL 95 - WOUND | Age: 70
Discharge: HOME | End: 2020-10-21
Attending: NURSE PRACTITIONER
Payer: COMMERCIAL

## 2020-10-21 DIAGNOSIS — L97.822: ICD-10-CM

## 2020-10-21 DIAGNOSIS — I50.9: ICD-10-CM

## 2020-10-21 DIAGNOSIS — L97.812: Primary | ICD-10-CM

## 2020-10-21 DIAGNOSIS — L03.116: ICD-10-CM

## 2020-10-21 DIAGNOSIS — Z79.899: ICD-10-CM

## 2020-10-21 DIAGNOSIS — Z79.01: ICD-10-CM

## 2020-10-21 DIAGNOSIS — I87.2: ICD-10-CM

## 2020-10-29 ENCOUNTER — HOSPITAL ENCOUNTER (OUTPATIENT)
Dept: HOSPITAL 95 - WOUND | Age: 70
Discharge: HOME | End: 2020-10-29
Attending: NURSE PRACTITIONER
Payer: COMMERCIAL

## 2020-10-29 DIAGNOSIS — L97.821: ICD-10-CM

## 2020-10-29 DIAGNOSIS — Z79.899: ICD-10-CM

## 2020-10-29 DIAGNOSIS — I87.2: ICD-10-CM

## 2020-10-29 DIAGNOSIS — I50.9: ICD-10-CM

## 2020-10-29 DIAGNOSIS — L97.822: Primary | ICD-10-CM

## 2020-11-12 ENCOUNTER — HOSPITAL ENCOUNTER (OUTPATIENT)
Dept: HOSPITAL 95 - WOUND | Age: 70
Discharge: HOME | End: 2020-11-12
Attending: NURSE PRACTITIONER
Payer: COMMERCIAL

## 2020-11-12 DIAGNOSIS — Z79.899: ICD-10-CM

## 2020-11-12 DIAGNOSIS — L97.822: ICD-10-CM

## 2020-11-12 DIAGNOSIS — I50.9: ICD-10-CM

## 2020-11-12 DIAGNOSIS — I87.2: ICD-10-CM

## 2020-11-12 DIAGNOSIS — Z88.5: ICD-10-CM

## 2020-11-12 DIAGNOSIS — H26.9: ICD-10-CM

## 2020-11-12 DIAGNOSIS — L02.416: ICD-10-CM

## 2020-11-12 DIAGNOSIS — M19.90: ICD-10-CM

## 2020-11-12 DIAGNOSIS — I96: Primary | ICD-10-CM

## 2020-11-12 DIAGNOSIS — L03.116: ICD-10-CM

## 2020-11-12 DIAGNOSIS — Z51.5: ICD-10-CM

## 2020-11-12 DIAGNOSIS — Z79.01: ICD-10-CM

## 2020-11-18 ENCOUNTER — HOSPITAL ENCOUNTER (OUTPATIENT)
Dept: HOSPITAL 95 - WOUND | Age: 70
Discharge: HOME | End: 2020-11-18
Attending: NURSE PRACTITIONER
Payer: COMMERCIAL

## 2020-11-18 DIAGNOSIS — I50.9: ICD-10-CM

## 2020-11-18 DIAGNOSIS — Z79.899: ICD-10-CM

## 2020-11-18 DIAGNOSIS — Z51.5: ICD-10-CM

## 2020-11-18 DIAGNOSIS — I96: Primary | ICD-10-CM

## 2020-11-18 DIAGNOSIS — L02.416: ICD-10-CM

## 2020-11-18 DIAGNOSIS — L97.822: ICD-10-CM

## 2020-11-18 DIAGNOSIS — Z79.01: ICD-10-CM

## 2020-11-18 DIAGNOSIS — H26.9: ICD-10-CM

## 2020-11-18 DIAGNOSIS — M19.90: ICD-10-CM

## 2020-11-18 DIAGNOSIS — Z88.5: ICD-10-CM

## 2020-11-18 DIAGNOSIS — I87.2: ICD-10-CM

## 2020-12-03 ENCOUNTER — HOSPITAL ENCOUNTER (OUTPATIENT)
Dept: HOSPITAL 95 - WOUND | Age: 70
Discharge: HOME | End: 2020-12-03
Attending: SURGERY
Payer: COMMERCIAL

## 2020-12-03 DIAGNOSIS — L97.822: ICD-10-CM

## 2020-12-03 DIAGNOSIS — I48.91: ICD-10-CM

## 2020-12-03 DIAGNOSIS — H26.9: ICD-10-CM

## 2020-12-03 DIAGNOSIS — L02.416: ICD-10-CM

## 2020-12-03 DIAGNOSIS — M19.90: ICD-10-CM

## 2020-12-03 DIAGNOSIS — I96: Primary | ICD-10-CM

## 2020-12-03 DIAGNOSIS — Z79.899: ICD-10-CM

## 2020-12-03 DIAGNOSIS — Z79.01: ICD-10-CM

## 2020-12-03 DIAGNOSIS — I87.2: ICD-10-CM

## 2020-12-03 DIAGNOSIS — Z88.5: ICD-10-CM

## 2020-12-03 PROCEDURE — G0463 HOSPITAL OUTPT CLINIC VISIT: HCPCS

## 2020-12-11 ENCOUNTER — HOSPITAL ENCOUNTER (OUTPATIENT)
Dept: HOSPITAL 95 - WOUND | Age: 70
Discharge: HOME | End: 2020-12-11
Attending: SURGERY
Payer: COMMERCIAL

## 2020-12-11 DIAGNOSIS — I87.2: ICD-10-CM

## 2020-12-11 DIAGNOSIS — L97.821: ICD-10-CM

## 2020-12-11 DIAGNOSIS — L97.822: Primary | ICD-10-CM

## 2020-12-11 DIAGNOSIS — I50.9: ICD-10-CM

## 2020-12-11 DIAGNOSIS — Z79.899: ICD-10-CM

## 2020-12-11 PROCEDURE — G0463 HOSPITAL OUTPT CLINIC VISIT: HCPCS

## 2023-11-09 NOTE — NUR
SHIFT SUMMARY;
 
A/A/OX4 DURING SHIFT. STATUS CHANGED TO MEDICAL TODAY, SNIFF PLACEMENT
RECOMMENDED TO PT WHO REFUSES. PT WORKED WITH PT TODAY, DIFFICULTY WITH
STANDING AND UNABLE TO AMBULATE WITH WALKER. LOWER EXTREMETIES RED, WARM WITH
DELAYED CAP REFILL. LARGE OPEN  WOUNDS BILATERALLY TO FEET AND ANKLES, LEFT
OPEN TO AIR PER DOCTOR. WEAPING CLEAR-YELLOW FLUID BILATERALLY TO LEGS. LEFT
KNEE HALF DOLLAR SIZED BLACK CIRCULAR ULCER WITH REDNESS AROUND IT, DRY AND
OPEN TO AIR. BRUISING BILATERALLY TO ARMS. POWER GLIDE IN PLACE TO ROBER, SALINE
LOCKED. BILATERAL PEDAL PULSES PALPABLE BUT FAINT. WITT IN PLACE DRAINING
CLEAR YELLOW URINE. WILL CONTINUE TO MONITOR AND TREAT UNTIL CHANGE OF SHIFT. No